# Patient Record
Sex: MALE | Race: WHITE | NOT HISPANIC OR LATINO | Employment: OTHER | URBAN - METROPOLITAN AREA
[De-identification: names, ages, dates, MRNs, and addresses within clinical notes are randomized per-mention and may not be internally consistent; named-entity substitution may affect disease eponyms.]

---

## 2017-05-18 PROBLEM — R50.9 FEBRILE ILLNESS: Status: ACTIVE | Noted: 2017-05-18

## 2017-11-30 ENCOUNTER — TELEPHONE (OUTPATIENT)
Dept: TRANSPLANT | Facility: CLINIC | Age: 47
End: 2017-11-30

## 2017-11-30 NOTE — TELEPHONE ENCOUNTER
----- Message from Brittany Mcguire sent at 11/30/2017 12:18 PM CST -----  We have the pt recorders and they are now pending review by the referral nurse.  By:Brittany Mcguire

## 2017-12-01 ENCOUNTER — DOCUMENTATION ONLY (OUTPATIENT)
Dept: TRANSPLANT | Facility: CLINIC | Age: 47
End: 2017-12-01

## 2017-12-01 NOTE — LETTER
December 1, 2017    Erik Saravia  [id number]   [place] [address]      Dear Erik Saravia:    Your doctor has referred you to the [facility]. You will be contacted by our office and an initial appointment will then be scheduled for you.    We look forward to seeing you soon. If you have any further questions, please contact us at [phone].       Sincerely,        [facility]   [address] [place] [address] [phone]

## 2017-12-01 NOTE — LETTER
December 1, 2017    Kamaljit Sutton MD  [address] [facility] [address]  [facility] [address]      Dear Dr. Sutton    Patient: Erik Saravia   MR Number: [id number]   YOB: 1970     Thank you for the referral of Erik Saravia to the [facility]. An initial appointment will be scheduled for your patient with one of our Hepatologists.      Thank you again for your trust in our program.  If there is anything we can do for you or your staff, please feel free to contact us.        Sincerely,        [facility]  [address] [place] [address] [phone]

## 2017-12-01 NOTE — NURSING
Pt records reviewed.  Pt will be referred to Hepatology due to PSC  Initial referral received  from Kamaljit Sutton's  office.   Referral letter sent to provider and patient.  Pt records reviewed.

## 2018-01-23 ENCOUNTER — LAB VISIT (OUTPATIENT)
Dept: LAB | Facility: HOSPITAL | Age: 48
End: 2018-01-23
Attending: INTERNAL MEDICINE
Payer: MEDICAID

## 2018-01-23 ENCOUNTER — PROCEDURE VISIT (OUTPATIENT)
Dept: HEPATOLOGY | Facility: CLINIC | Age: 48
End: 2018-01-23
Attending: INTERNAL MEDICINE
Payer: MEDICAID

## 2018-01-23 ENCOUNTER — OFFICE VISIT (OUTPATIENT)
Dept: HEPATOLOGY | Facility: CLINIC | Age: 48
End: 2018-01-23
Payer: MEDICAID

## 2018-01-23 VITALS
BODY MASS INDEX: 26.67 KG/M2 | WEIGHT: 190.5 LBS | OXYGEN SATURATION: 98 % | HEIGHT: 71 IN | SYSTOLIC BLOOD PRESSURE: 137 MMHG | RESPIRATION RATE: 18 BRPM | DIASTOLIC BLOOD PRESSURE: 84 MMHG | HEART RATE: 67 BPM

## 2018-01-23 DIAGNOSIS — K83.01 PRIMARY SCLEROSING CHOLANGITIS: ICD-10-CM

## 2018-01-23 DIAGNOSIS — K83.01 PSC (PRIMARY SCLEROSING CHOLANGITIS): Primary | ICD-10-CM

## 2018-01-23 DIAGNOSIS — R74.8 ELEVATED LIVER ENZYMES: ICD-10-CM

## 2018-01-23 DIAGNOSIS — K83.01 PSC (PRIMARY SCLEROSING CHOLANGITIS): ICD-10-CM

## 2018-01-23 LAB
ALBUMIN SERPL BCP-MCNC: 3.5 G/DL
ALP SERPL-CCNC: 248 U/L
ALT SERPL W/O P-5'-P-CCNC: 102 U/L
ANION GAP SERPL CALC-SCNC: 6 MMOL/L
AST SERPL-CCNC: 56 U/L
BASOPHILS # BLD AUTO: 0.09 K/UL
BASOPHILS NFR BLD: 1.3 %
BILIRUB SERPL-MCNC: 0.5 MG/DL
BUN SERPL-MCNC: 13 MG/DL
CALCIUM SERPL-MCNC: 10.3 MG/DL
CANCER AG19-9 SERPL-ACNC: 29 U/ML
CHLORIDE SERPL-SCNC: 107 MMOL/L
CO2 SERPL-SCNC: 27 MMOL/L
CREAT SERPL-MCNC: 0.9 MG/DL
DIFFERENTIAL METHOD: NORMAL
EOSINOPHIL # BLD AUTO: 0.2 K/UL
EOSINOPHIL NFR BLD: 3 %
ERYTHROCYTE [DISTWIDTH] IN BLOOD BY AUTOMATED COUNT: 13 %
EST. GFR  (AFRICAN AMERICAN): >60 ML/MIN/1.73 M^2
EST. GFR  (NON AFRICAN AMERICAN): >60 ML/MIN/1.73 M^2
FERRITIN SERPL-MCNC: 92 NG/ML
GLUCOSE SERPL-MCNC: 92 MG/DL
HBV SURFACE AB SER-ACNC: POSITIVE M[IU]/ML
HCT VFR BLD AUTO: 43.9 %
HEPATITIS A ANTIBODY, IGG: POSITIVE
HGB BLD-MCNC: 14.9 G/DL
IGG SERPL-MCNC: 1163 MG/DL
IMM GRANULOCYTES # BLD AUTO: 0.02 K/UL
IMM GRANULOCYTES NFR BLD AUTO: 0.3 %
INR PPP: 0.9
IRON SERPL-MCNC: 64 UG/DL
LYMPHOCYTES # BLD AUTO: 2.6 K/UL
LYMPHOCYTES NFR BLD: 36.6 %
MCH RBC QN AUTO: 30.5 PG
MCHC RBC AUTO-ENTMCNC: 33.9 G/DL
MCV RBC AUTO: 90 FL
MONOCYTES # BLD AUTO: 0.6 K/UL
MONOCYTES NFR BLD: 9.2 %
NEUTROPHILS # BLD AUTO: 3.5 K/UL
NEUTROPHILS NFR BLD: 49.6 %
NRBC BLD-RTO: 0 /100 WBC
PLATELET # BLD AUTO: 267 K/UL
PMV BLD AUTO: 10.5 FL
POTASSIUM SERPL-SCNC: 4.3 MMOL/L
PROT SERPL-MCNC: 7.5 G/DL
PROTHROMBIN TIME: 9.8 SEC
RBC # BLD AUTO: 4.89 M/UL
SATURATED IRON: 17 %
SODIUM SERPL-SCNC: 140 MMOL/L
TOTAL IRON BINDING CAPACITY: 386 UG/DL
TRANSFERRIN SERPL-MCNC: 261 MG/DL
WBC # BLD AUTO: 6.97 K/UL

## 2018-01-23 PROCEDURE — 85025 COMPLETE CBC W/AUTO DIFF WBC: CPT

## 2018-01-23 PROCEDURE — 99204 OFFICE O/P NEW MOD 45 MIN: CPT | Mod: S$PBB,25,, | Performed by: INTERNAL MEDICINE

## 2018-01-23 PROCEDURE — 86706 HEP B SURFACE ANTIBODY: CPT

## 2018-01-23 PROCEDURE — 91200 LIVER ELASTOGRAPHY: CPT | Mod: PBBFAC | Performed by: INTERNAL MEDICINE

## 2018-01-23 PROCEDURE — 86790 VIRUS ANTIBODY NOS: CPT

## 2018-01-23 PROCEDURE — 36415 COLL VENOUS BLD VENIPUNCTURE: CPT

## 2018-01-23 PROCEDURE — 86235 NUCLEAR ANTIGEN ANTIBODY: CPT

## 2018-01-23 PROCEDURE — 86256 FLUORESCENT ANTIBODY TITER: CPT | Mod: 91

## 2018-01-23 PROCEDURE — 82784 ASSAY IGA/IGD/IGG/IGM EACH: CPT

## 2018-01-23 PROCEDURE — 85610 PROTHROMBIN TIME: CPT

## 2018-01-23 PROCEDURE — 99999 PR PBB SHADOW E&M-EST. PATIENT-LVL IV: CPT | Mod: PBBFAC,,, | Performed by: INTERNAL MEDICINE

## 2018-01-23 PROCEDURE — 91200 LIVER ELASTOGRAPHY: CPT | Mod: 26,S$PBB,, | Performed by: INTERNAL MEDICINE

## 2018-01-23 PROCEDURE — 99214 OFFICE O/P EST MOD 30 MIN: CPT | Mod: PBBFAC,25 | Performed by: INTERNAL MEDICINE

## 2018-01-23 PROCEDURE — 83540 ASSAY OF IRON: CPT

## 2018-01-23 PROCEDURE — 82728 ASSAY OF FERRITIN: CPT

## 2018-01-23 PROCEDURE — 86301 IMMUNOASSAY TUMOR CA 19-9: CPT

## 2018-01-23 PROCEDURE — 86038 ANTINUCLEAR ANTIBODIES: CPT

## 2018-01-23 PROCEDURE — 80053 COMPREHEN METABOLIC PANEL: CPT

## 2018-01-23 RX ORDER — PREDNISONE 5 MG/1
5 TABLET ORAL DAILY
Status: ON HOLD | COMMUNITY
Start: 2017-11-15 | End: 2021-11-25 | Stop reason: HOSPADM

## 2018-01-23 RX ORDER — DICYCLOMINE HYDROCHLORIDE 10 MG/1
10 CAPSULE ORAL DAILY PRN
COMMUNITY
Start: 2017-12-18

## 2018-01-23 NOTE — PROGRESS NOTES
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 He has had 2 ERCPs.    I had suggested a liver biopsy to r/o an autoimmune overlap, since his hepatic panel showed a hepatocellular pattern and not a cholestatic pattern. In addition, he has an ASMA that was positive, although weakly positive. This was not done.    He was treated with Simponi x 3. 5 yrs. Because his liver tests increased (, , ALKP 231, Tbil 1.5), this drug was stopped 10/17. Since then a repeat hepatic panel was done today: , AST 56, Tbil 0.5, ALKP 248.    Outpatient Encounter Prescriptions as of 1/23/2018   Medication Sig Dispense Refill    dicyclomine (BENTYL) 10 MG capsule Take 10 mg by mouth as needed.      predniSONE (DELTASONE) 5 MG tablet Take 5 mg by mouth once daily.      ALBUTEROL INHL Inhale 1 puff into the lungs every 6 (six) hours as needed.      BIFIDOBACTERIUM INFANTIS (ALIGN ORAL) Take 1 capsule by mouth every morning.       fluticasone (FLONASE) 50 mcg/actuation nasal spray 1 spray by Each Nare route 2 (two) times daily as needed for Rhinitis or Allergies. 16 g 11    GOLIMUMAB (SIMPONI SUBQ) Inject 50 mg into the skin every 30 days.      MONTELUKAST SODIUM (SINGULAIR ORAL) Take 5 mg by mouth every morning.      omeprazole (PRILOSEC OTC) 20 MG tablet Take 20 mg by mouth every morning.      ondansetron (ZOFRAN) 4 MG tablet Take 1 tablet (4 mg total) by mouth every 6 (six) hours as needed. 12 tablet 0    tamsulosin (FLOMAX) 0.4 mg Cp24 Take 1 capsule (0.4 mg total) by mouth once daily. (Patient taking differently: Take 0.4 mg by mouth every morning. ) 90 capsule 3    vancomycin (VANCOCIN) 250 MG capsule Take 250 mg by mouth every Mon, Wed, Fri.        No facility-administered encounter medications on file as of 1/23/2018.      Allergies   Allergen Reactions    Cinnamon Analogues  Hives and Itching    Ciprofloxacin      Other reaction(s): Rash  Other reaction(s): Itching  Other reaction(s): Hives    Mesalamine      Other reaction(s): Diarrhea  Other reaction(s): blood stool and diarrhea    Uceris [Budesonide]      Migraines/headaches intensify    Zolmitriptan      Other reaction(s): Angioedema     Past Medical History:   Diagnosis Date    Allergy     Anemia     Asthma     External hemorrhoids with other complication 5/8/2011    General anesthetics causing adverse effect in therapeutic use     History of recent hospitalization     [facility]: 9/23/2016 to 9/24/2016: Fever and UC flare    History of recent hospitalization     [facility]: 10/29/2016 to 10/31/2016 for fever and UC flare    Liver disease     Migraines     Pancreatitis     Primary sclerosing cholangitis     Ulcerative colitis        Review of Systems   Constitutional: Negative.    HENT: Negative.    Eyes: Negative.    Respiratory: Negative.    Cardiovascular: Negative.    Gastrointestinal: Negative.    Genitourinary: Negative.    Musculoskeletal: Negative.    Skin: Negative.    Neurological: Negative.    Psychiatric/Behavioral: Negative.      Vitals:    01/23/18 1020   BP: 137/84   Pulse: 67   Resp: 18       Physical Exam   Constitutional: He is oriented to person, place, and time. He appears well-developed and well-nourished.   HENT:   Head: Normocephalic and atraumatic.   Eyes: Conjunctivae and EOM are normal. Pupils are equal, round, and reactive to light. No scleral icterus.   Neck: Normal range of motion. Neck supple. No thyromegaly present.   Cardiovascular: Normal rate, regular rhythm and normal heart sounds.    Pulmonary/Chest: Effort normal and breath sounds normal. He has no rales.   Abdominal: Soft. Bowel sounds are normal. He exhibits no distension and no mass. There is no tenderness.   Musculoskeletal: Normal range of motion. He exhibits no edema.   Neurological: He is alert and oriented to person, place, and time.    Skin: Skin is warm and dry. No rash noted.   Psychiatric: He has a normal mood and affect.   Vitals reviewed.      Lab Results   Component Value Date     (H) 11/01/2017    BUN 13 11/01/2017    CREATININE 1.19 11/01/2017    CALCIUM 10.1 11/01/2017     11/01/2017    K 3.6 11/01/2017    CL 97 11/01/2017    PROT 7.8 11/01/2017    CO2 31 11/01/2017    ANIONGAP 11 11/01/2017    WBC 11.19 11/01/2017    RBC 4.91 11/01/2017    HGB 15.4 11/01/2017    HCT 44.8 11/01/2017    MCV 91 11/01/2017    MCH 31.4 (H) 11/01/2017    MCHC 34.4 11/01/2017     Lab Results   Component Value Date    RDW 14.1 11/01/2017     11/01/2017    MPV 10.7 11/01/2017    GRAN 9.5 (H) 11/01/2017    GRAN 84.5 (H) 11/01/2017    LYMPH 1.0 11/01/2017    LYMPH 8.8 (L) 11/01/2017    MONO 0.7 11/01/2017    MONO 6.3 11/01/2017    EOSINOPHIL 0.0 11/01/2017    BASOPHIL 0.4 11/01/2017    EOS 0.0 11/01/2017    BASO 0.04 11/01/2017    APTT 25.8 05/07/2008    MAGNOLIA Negative 04/04/2012    CHOL 207 (H) 04/30/2016    TRIG 110 04/30/2016    HDL 63 04/30/2016    CHOLHDL 30.4 04/30/2016    TOTALCHOLEST 3.3 04/30/2016    ALBUMIN 4.3 11/01/2017    BILIDIR 0.1 07/19/2013     (H) 11/01/2017    AST 74 (H) 02/24/2016     (H) 11/01/2017    ALKPHOS 231 (H) 11/01/2017    MG 1.8 05/19/2017    LABPROT 10.1 11/22/2013    INR 1.0 10/31/2016    INR 0.9 11/22/2013    QUANTIFERON Negative 08/21/2013         Assessment and Plan:    Erik Saravia is a 47 y.o. male wita history of PSC and Elevated Hepatic Enzymes  in the setting of UC. My current recommendations:  1. Elevated liver tests- This may have been due to the biologic. I would consider restarting with close f/u since the pt thinks this biologic worked very well for him. Autoimmune hepatitis remains on the differential A liver biopsy is the only way to rule this in/out. He would like to discuss this with his local GI.  2. PSC. Monitor for cholangitis. Vanco through Dr Templeton. Check CA 19-9; annual  screening for cholangioca; fibroscan      Return prn  .

## 2018-01-23 NOTE — PATIENT INSTRUCTIONS
1. Labs today- may need liver biopsy. If normal then restart folimumab and monitor liver enzymes closely  2. vanco through Dr Templeton  3.   4. Annual screening for cholangioca  5. fibroscan  Return to be determined

## 2018-01-24 LAB
ANA SER QL IF: NORMAL
MITOCHONDRIA AB TITR SER IF: NORMAL {TITER}
SMOOTH MUSCLE AB TITR SER IF: NORMAL {TITER}

## 2018-01-28 NOTE — PROCEDURES
Procedures Fibroscan Procedure     Name: Erik Saravia  Date of Procedure : 2018   :: Shila Becerra MD  Diagnosis: Cholestatic    Probe: M    Fibroscan readin.4 KPa    Fibrosis:F2     CAP readin dB/m    Steatosis: :S2

## 2018-01-29 ENCOUNTER — TELEPHONE (OUTPATIENT)
Dept: GASTROENTEROLOGY | Facility: CLINIC | Age: 48
End: 2018-01-29

## 2018-01-29 NOTE — TELEPHONE ENCOUNTER
Left  for Ivette, informing her that we have received this referral. [phone] call back number provided.

## 2018-01-29 NOTE — TELEPHONE ENCOUNTER
----- Message from Jolanta Arce sent at 1/29/2018 10:28 AM CST -----  Contact: Dr. Kamaljit Sutton  Good morning, Dr. Sutton would like to refer the following patient to Dr. Templeton in the gastroenterology department. The patients diagnosis is UC. I have scanned the patients records into media manager. If there are any further questions in regards to the patient, please contact Dr. Sutton's referral coordinator, Ivette, at [phone]. Also, my extension is [phone].   Please let me know if I can help schedule in any way.  Thank you,   Jolanta

## 2018-02-16 ENCOUNTER — TELEPHONE (OUTPATIENT)
Dept: GASTROENTEROLOGY | Facility: CLINIC | Age: 48
End: 2018-02-16

## 2018-02-16 NOTE — TELEPHONE ENCOUNTER
Pt is scheduled for a new patient clinic appointment with Dr. MANDY Templeton on 2/26/2018. E-mail and physical letter sent to pt with appointment reminder, new patient welcome letter as well as a campus map.    Dr. Sutton's office notified of appointment date.
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
## 2018-02-26 ENCOUNTER — OFFICE VISIT (OUTPATIENT)
Dept: GASTROENTEROLOGY | Facility: CLINIC | Age: 48
End: 2018-02-26
Payer: MEDICAID

## 2018-02-26 ENCOUNTER — LAB VISIT (OUTPATIENT)
Dept: LAB | Facility: HOSPITAL | Age: 48
End: 2018-02-26
Attending: INTERNAL MEDICINE
Payer: MEDICAID

## 2018-02-26 VITALS
WEIGHT: 191.13 LBS | SYSTOLIC BLOOD PRESSURE: 131 MMHG | BODY MASS INDEX: 26.76 KG/M2 | DIASTOLIC BLOOD PRESSURE: 91 MMHG | TEMPERATURE: 98 F | HEIGHT: 71 IN | HEART RATE: 77 BPM

## 2018-02-26 DIAGNOSIS — Z86.19 HISTORY OF CLOSTRIDIUM DIFFICILE INFECTION: ICD-10-CM

## 2018-02-26 DIAGNOSIS — R19.7 DIARRHEA, UNSPECIFIED TYPE: ICD-10-CM

## 2018-02-26 DIAGNOSIS — K51.00 ULCERATIVE PANCOLITIS WITHOUT COMPLICATION: Primary | ICD-10-CM

## 2018-02-26 DIAGNOSIS — K83.01 PRIMARY SCLEROSING CHOLANGITIS: ICD-10-CM

## 2018-02-26 DIAGNOSIS — K51.00 ULCERATIVE PANCOLITIS WITHOUT COMPLICATION: ICD-10-CM

## 2018-02-26 PROBLEM — R50.9 FEBRILE ILLNESS: Status: RESOLVED | Noted: 2017-05-18 | Resolved: 2018-02-26

## 2018-02-26 PROCEDURE — 86480 TB TEST CELL IMMUN MEASURE: CPT

## 2018-02-26 PROCEDURE — 99214 OFFICE O/P EST MOD 30 MIN: CPT | Mod: PBBFAC | Performed by: INTERNAL MEDICINE

## 2018-02-26 PROCEDURE — 3008F BODY MASS INDEX DOCD: CPT | Mod: ,,, | Performed by: INTERNAL MEDICINE

## 2018-02-26 PROCEDURE — 36415 COLL VENOUS BLD VENIPUNCTURE: CPT

## 2018-02-26 PROCEDURE — 99205 OFFICE O/P NEW HI 60 MIN: CPT | Mod: S$PBB,,, | Performed by: INTERNAL MEDICINE

## 2018-02-26 PROCEDURE — 99999 PR PBB SHADOW E&M-EST. PATIENT-LVL IV: CPT | Mod: PBBFAC,,, | Performed by: INTERNAL MEDICINE

## 2018-02-26 NOTE — PATIENT INSTRUCTIONS
Instructions:  - labs today  - stool studies today--can turn in at [facility] this week  - continue prednisone 5 mg PO daily per Dr. Sutton  - continue bentyl 10 mg PRN per Dr. Sutton  - do not restart Simponi  - Dexa scan--defer to Dr. Sutton once off of prednisone  - low fiber/low residue diet  - options for treatment--Dr. Templeton will send a detailed letter to Dr. Sutton:   1. High dose vancomycin--500 mg TID  2. Weight-based dose Imuran (2.5 mg/kg)  3. Entyvio  4. Remicade  - Avoid all NSAIDs (Advil, Ibuprofen, Motrin, Aspirin, Naprosyn, Aleve)  - Yearly Eye exam  - Yearly Skin exam--wear sun block and hats  - Use antibiotics with caution  - Vaccines needed:   Flu shot yearly  Tetanus every 10 years  Pneumonia 13  (PCV13) vaccine initial  Pneumonia 23 (PPSV23) due again at age 63  - Follow up with Dr. Sutton in next 2 weeks

## 2018-02-26 NOTE — LETTER
February 26, 2018        Kamaljit Sutton MD  [address] [facility] [address]  [facility] [address]             [facility]  [address]  [facility] [address]  Phone: [phone]  Fax: [phone]   Patient: Erik Saravia   MR Number: [id number]   YOB: 1970   Date of Visit: 2/26/2018       Dear Dr. Sutton:    Thank you for referring Erik Saravia to me for evaluation. Attached you will find relevant portions of my assessment and plan of care.    If you have questions, please do not hesitate to call me. I look forward to following Erik Saravia along with you.    Sincerely,      Johny Templeton MD            CC  Shila Becerra MD    [facility]

## 2018-02-27 ENCOUNTER — LAB VISIT (OUTPATIENT)
Dept: LAB | Facility: HOSPITAL | Age: 48
End: 2018-02-27
Attending: FAMILY MEDICINE
Payer: MEDICAID

## 2018-02-27 DIAGNOSIS — R19.7 DIARRHEA, UNSPECIFIED TYPE: ICD-10-CM

## 2018-02-27 DIAGNOSIS — Z86.19 HISTORY OF CLOSTRIDIUM DIFFICILE INFECTION: ICD-10-CM

## 2018-02-27 DIAGNOSIS — K51.00 ULCERATIVE PANCOLITIS WITHOUT COMPLICATION: ICD-10-CM

## 2018-02-27 LAB
C DIFF GDH STL QL: POSITIVE
C DIFF TOX A+B STL QL IA: POSITIVE
MITOGEN NIL: >10 IU/ML
NIL: 0.04 IU/ML
TB ANTIGEN NIL: -0.01 IU/ML
TB ANTIGEN: 0.03 IU/ML
TB GOLD: NEGATIVE

## 2018-02-27 PROCEDURE — 87046 STOOL CULTR AEROBIC BACT EA: CPT

## 2018-02-27 PROCEDURE — 87427 SHIGA-LIKE TOXIN AG IA: CPT | Mod: 59

## 2018-02-27 PROCEDURE — 87045 FECES CULTURE AEROBIC BACT: CPT

## 2018-02-27 PROCEDURE — 87209 SMEAR COMPLEX STAIN: CPT

## 2018-02-27 PROCEDURE — 87329 GIARDIA AG IA: CPT

## 2018-02-27 PROCEDURE — 87449 NOS EACH ORGANISM AG IA: CPT

## 2018-02-27 PROCEDURE — 83993 ASSAY FOR CALPROTECTIN FECAL: CPT

## 2018-02-28 ENCOUNTER — TELEPHONE (OUTPATIENT)
Dept: GASTROENTEROLOGY | Facility: CLINIC | Age: 48
End: 2018-02-28

## 2018-02-28 LAB
CRYPTOSP AG STL QL IA: NEGATIVE
G LAMBLIA AG STL QL IA: NEGATIVE
O+P STL TRI STN: NORMAL

## 2018-02-28 NOTE — TELEPHONE ENCOUNTER
Called patient and let him know that he has stool C diff positive. Ideally he would benefit from oral vancomycin given this is may also help as off label treatment for his UC and PSC. Discussed this with Dr. Sutton and he will be starting high dose oral vancomycin 500 mg TID (alvogen, ANI brand preferred).      Our office will fax all results of labs/stool studies thus far to Dr. Sutton's office along with this note.     MANDY Templeton

## 2018-02-28 NOTE — TELEPHONE ENCOUNTER
----- Message from LAN Vivas sent at 2/28/2018  8:24 AM CST -----  Elma,    Can you call the lab and enquire why there is no C. Diff PCR pending if the patient's C. Diff antigen and toxin is positive.  I do not see one pending.    Thanks,  Melany

## 2018-02-28 NOTE — TELEPHONE ENCOUNTER
Kristi spoke to the her senior the lab and was informed that if the C Diff antigen and toxin are positive, a reflex PCR is not necessary.

## 2018-03-01 LAB
E COLI SXT1 STL QL IA: NEGATIVE
E COLI SXT2 STL QL IA: NEGATIVE

## 2018-03-03 LAB — BACTERIA STL CULT: NORMAL

## 2018-03-06 LAB — CALPROTECTIN STL-MCNT: 421.8 MCG/G

## 2018-04-10 ENCOUNTER — TELEPHONE (OUTPATIENT)
Dept: GASTROENTEROLOGY | Facility: CLINIC | Age: 48
End: 2018-04-10

## 2018-04-10 NOTE — TELEPHONE ENCOUNTER
"----- Message from Jolanta Arce sent at 4/10/2018  1:53 PM CDT -----  Contact: Additional records  Good afternoon, I received the following patients additional medical records and scanned them into the  tab. I labelled the documents under, "gastro records part 4" Please let me know if there is anything further I can do.   Thank you,   Jolanta"

## 2018-07-31 ENCOUNTER — TELEPHONE (OUTPATIENT)
Dept: GASTROENTEROLOGY | Facility: CLINIC | Age: 48
End: 2018-07-31

## 2018-07-31 NOTE — TELEPHONE ENCOUNTER
----- Message from Marshall Martinez sent at 7/31/2018  1:36 PM CDT -----  Clinic notes were sent over for pt and are scanned into media mgr within [facility].

## 2019-03-08 PROBLEM — R13.10 DYSPHAGIA: Status: ACTIVE | Noted: 2019-03-08

## 2021-11-23 PROBLEM — R50.9 FEVER: Status: ACTIVE | Noted: 2021-11-23

## 2021-11-23 PROBLEM — E86.1 INTRAVASCULAR VOLUME DEPLETION: Status: ACTIVE | Noted: 2021-11-23

## 2022-07-01 ENCOUNTER — TELEPHONE (OUTPATIENT)
Dept: PODIATRY | Facility: CLINIC | Age: 52
End: 2022-07-01
Payer: MEDICAID

## 2022-07-01 NOTE — TELEPHONE ENCOUNTER
----- Message from Monique Ellsworth sent at 7/1/2022 12:50 PM CDT -----  Contact: [phone]  Type: Needs Medical Advice  Who Called:  Pt    Best Call Back Number: [phone]    Additional Information: Pt is calling to schedule off a referral that was faxed over to your office. Pls call back and advise     Referral from [facility]

## 2022-08-22 ENCOUNTER — OFFICE VISIT (OUTPATIENT)
Dept: PODIATRY | Facility: CLINIC | Age: 52
End: 2022-08-22
Payer: MEDICAID

## 2022-08-22 ENCOUNTER — HOSPITAL ENCOUNTER (OUTPATIENT)
Dept: RADIOLOGY | Facility: HOSPITAL | Age: 52
Discharge: HOME | End: 2022-08-22
Attending: PODIATRIST
Payer: MEDICAID

## 2022-08-22 DIAGNOSIS — M21.6X2 ACQUIRED EQUINUS DEFORMITY OF BOTH FEET: ICD-10-CM

## 2022-08-22 DIAGNOSIS — B35.1 ONYCHOMYCOSIS DUE TO DERMATOPHYTE: ICD-10-CM

## 2022-08-22 DIAGNOSIS — M21.6X1 ACQUIRED EQUINUS DEFORMITY OF BOTH FEET: ICD-10-CM

## 2022-08-22 DIAGNOSIS — M76.821 POSTERIOR TIBIAL TENDON DYSFUNCTION (PTTD) OF RIGHT LOWER EXTREMITY: ICD-10-CM

## 2022-08-22 DIAGNOSIS — M76.821 POSTERIOR TIBIAL TENDON DYSFUNCTION (PTTD) OF RIGHT LOWER EXTREMITY: Primary | ICD-10-CM

## 2022-08-22 PROCEDURE — 99204 PR OFFICE/OUTPT VISIT, NEW, LEVL IV, 45-59 MIN: ICD-10-PCS | Mod: S$PBB,,, | Performed by: PODIATRIST

## 2022-08-22 PROCEDURE — 99213 OFFICE O/P EST LOW 20 MIN: CPT | Mod: PBBFAC,PN | Performed by: PODIATRIST

## 2022-08-22 PROCEDURE — 99999 PR PBB SHADOW E&M-EST. PATIENT-LVL III: ICD-10-PCS | Mod: PBBFAC,,, | Performed by: PODIATRIST

## 2022-08-22 PROCEDURE — 73630 XR FOOT COMPLETE 3 VIEW RIGHT: ICD-10-PCS | Mod: 26,RT,, | Performed by: RADIOLOGY

## 2022-08-22 PROCEDURE — 1160F PR REVIEW ALL MEDS BY PRESCRIBER/CLIN PHARMACIST DOCUMENTED: ICD-10-PCS | Mod: CPTII,,, | Performed by: PODIATRIST

## 2022-08-22 PROCEDURE — 1159F PR MEDICATION LIST DOCUMENTED IN MEDICAL RECORD: ICD-10-PCS | Mod: CPTII,,, | Performed by: PODIATRIST

## 2022-08-22 PROCEDURE — 73630 X-RAY EXAM OF FOOT: CPT | Mod: TC,PO,RT

## 2022-08-22 PROCEDURE — 99999 PR PBB SHADOW E&M-EST. PATIENT-LVL III: CPT | Mod: PBBFAC,,, | Performed by: PODIATRIST

## 2022-08-22 PROCEDURE — 99204 OFFICE O/P NEW MOD 45 MIN: CPT | Mod: S$PBB,,, | Performed by: PODIATRIST

## 2022-08-22 PROCEDURE — 1160F RVW MEDS BY RX/DR IN RCRD: CPT | Mod: CPTII,,, | Performed by: PODIATRIST

## 2022-08-22 PROCEDURE — 1159F MED LIST DOCD IN RCRD: CPT | Mod: CPTII,,, | Performed by: PODIATRIST

## 2022-08-22 PROCEDURE — 73630 X-RAY EXAM OF FOOT: CPT | Mod: 26,RT,, | Performed by: RADIOLOGY

## 2022-08-22 NOTE — PROGRESS NOTES
Subjective:      Patient ID: Erik Saravia is a 52 y.o. male.    Chief Complaint: Ankle Injury    Erik is a 52 y.o. male who presents to the podiatry clinic  with complaint of  right foot pain. Onset of the symptoms was several weeks ago. Precipitating event: first had an inversion injury months ago and as that was getting better a large wood beam fell on his foot. Current symptoms include: ability to bear weight, but with some pain, swelling and worsening symptoms after a period of activity. Aggravating factors: any weight bearing. Symptoms have waxed and waned. Patient has had prior foot problems. Evaluation to date: plain films: only some spurring noted. Treatment to date: none. Patients rates pain 6/10 on pain scale.    Also has some questions about some discoloration in the left second nail.      Review of Systems   Constitutional: Negative for chills and fever.   Cardiovascular: Negative for claudication and leg swelling.   Respiratory: Negative for shortness of breath.    Skin: Positive for nail changes. Negative for itching and rash.   Musculoskeletal: Negative for muscle cramps, muscle weakness and myalgias.   Gastrointestinal: Negative for nausea and vomiting.   Neurological: Negative for focal weakness, loss of balance, numbness and paresthesias.           Objective:      Physical Exam  Constitutional:       General: He is not in acute distress.     Appearance: He is well-developed. He is not diaphoretic.   Cardiovascular:      Pulses:           Dorsalis pedis pulses are 2+ on the right side and 2+ on the left side.        Posterior tibial pulses are 2+ on the right side and 2+ on the left side.      Comments: < 3 sec capillary refill time to toes 1-5 bilateral. Toes and feet are warm to touch proximally with normal distal cooling b/l. There is some hair growth on the feet and toes b/l. There is no edema b/l. No spider veins or varicosities present b/l.     Musculoskeletal:      Comments: Equinus  noted b/l ankles with < 10 deg DF noted. MMT 5/5 in DF/PF/Inv/Ev resistance with no reproduction of pain in any direction. Passive range of motion of ankle and pedal joints is painless b/l.    Medial arch collapse with weight bearing bilateral worse on the right, there is pain to the right PT tendon distally and there is pain with single heel rise right foot.      Skin:     General: Skin is warm and dry.      Coloration: Skin is not pale.      Findings: No abrasion, bruising, burn, ecchymosis, erythema, laceration, lesion, petechiae or rash.      Nails: There is no clubbing.      Comments: Skin temperature, texture and turgor within normal limits.   Neurological:      Mental Status: He is alert and oriented to person, place, and time.      Sensory: No sensory deficit.      Motor: No tremor, atrophy or abnormal muscle tone.      Comments: Negative tinel sign bilateral.   Psychiatric:         Behavior: Behavior normal.               Assessment:       Encounter Diagnoses   Name Primary?    Posterior tibial tendon dysfunction (PTTD) of right lower extremity Yes    Acquired equinus deformity of both feet     Onychomycosis due to dermatophyte          Plan:       Erik was seen today for ankle injury.    Diagnoses and all orders for this visit:    Posterior tibial tendon dysfunction (PTTD) of right lower extremity  -     X-Ray Foot Complete Right; Future    Acquired equinus deformity of both feet  -     X-Ray Foot Complete Right; Future    Onychomycosis due to dermatophyte      I counseled the patient on his conditions, their implications and medical management.    Patient will obtain over the counter arch supports and wear them in shoes whenever possible.  Athletic shoes intended for walking or running are usually best.    Patient will stretch the tendo achilles complex three times daily as demonstrated in the office.  Literature was dispensed illustrating proper stretching technique.    Discussed treatment options  for fungal toenails to include topical vs oral vs laser vs combined therapy in detail.    Prescribed CMPD Voriconazole 2.67%, Vancomycin 6.67% Nail Suspension in DMSO, apply twice daily to affected nails    If pain persists consider boot and or MRI    Return 6 weeks for follow up    Jaswant Garner DPM

## 2022-10-03 ENCOUNTER — OFFICE VISIT (OUTPATIENT)
Dept: PODIATRY | Facility: CLINIC | Age: 52
End: 2022-10-03
Payer: MEDICAID

## 2022-10-03 VITALS — WEIGHT: 185.44 LBS | BODY MASS INDEX: 25.96 KG/M2 | HEIGHT: 71 IN

## 2022-10-03 DIAGNOSIS — M76.821 POSTERIOR TIBIAL TENDON DYSFUNCTION (PTTD) OF RIGHT LOWER EXTREMITY: Primary | ICD-10-CM

## 2022-10-03 DIAGNOSIS — M21.6X1 ACQUIRED EQUINUS DEFORMITY OF BOTH FEET: ICD-10-CM

## 2022-10-03 DIAGNOSIS — M21.6X2 ACQUIRED EQUINUS DEFORMITY OF BOTH FEET: ICD-10-CM

## 2022-10-03 PROCEDURE — 99213 OFFICE O/P EST LOW 20 MIN: CPT | Mod: PBBFAC,PN | Performed by: PODIATRIST

## 2022-10-03 PROCEDURE — 1159F MED LIST DOCD IN RCRD: CPT | Mod: CPTII,,, | Performed by: PODIATRIST

## 2022-10-03 PROCEDURE — 99999 PR PBB SHADOW E&M-EST. PATIENT-LVL III: CPT | Mod: PBBFAC,,, | Performed by: PODIATRIST

## 2022-10-03 PROCEDURE — 1160F RVW MEDS BY RX/DR IN RCRD: CPT | Mod: CPTII,,, | Performed by: PODIATRIST

## 2022-10-03 PROCEDURE — 1159F PR MEDICATION LIST DOCUMENTED IN MEDICAL RECORD: ICD-10-PCS | Mod: CPTII,,, | Performed by: PODIATRIST

## 2022-10-03 PROCEDURE — 99212 OFFICE O/P EST SF 10 MIN: CPT | Mod: S$PBB,,, | Performed by: PODIATRIST

## 2022-10-03 PROCEDURE — 99999 PR PBB SHADOW E&M-EST. PATIENT-LVL III: ICD-10-PCS | Mod: PBBFAC,,, | Performed by: PODIATRIST

## 2022-10-03 PROCEDURE — 1160F PR REVIEW ALL MEDS BY PRESCRIBER/CLIN PHARMACIST DOCUMENTED: ICD-10-PCS | Mod: CPTII,,, | Performed by: PODIATRIST

## 2022-10-03 PROCEDURE — 99212 PR OFFICE/OUTPT VISIT, EST, LEVL II, 10-19 MIN: ICD-10-PCS | Mod: S$PBB,,, | Performed by: PODIATRIST

## 2022-10-03 PROCEDURE — 3008F PR BODY MASS INDEX (BMI) DOCUMENTED: ICD-10-PCS | Mod: CPTII,,, | Performed by: PODIATRIST

## 2022-10-03 PROCEDURE — 3008F BODY MASS INDEX DOCD: CPT | Mod: CPTII,,, | Performed by: PODIATRIST

## 2022-10-03 NOTE — PROGRESS NOTES
Subjective:      Patient ID: Erik Saravia is a 52 y.o. male.    Chief Complaint: No chief complaint on file.    Erik is a 52 y.o. male who presents to the podiatry clinic  with complaint of  right foot pain. Onset of the symptoms was several weeks ago. Precipitating event:  first had an inversion injury months ago and as that was getting better a large wood beam fell on his foot . Current symptoms include: ability to bear weight, but with some pain, swelling and worsening symptoms after a period of activity. Aggravating factors: any weight bearing. Symptoms have waxed and waned. Patient has had prior foot problems. Evaluation to date: plain films: only some spurring noted . Treatment to date: none. Patients rates pain 6/10 on pain scale.    10/3/22: Patient presents for follow up right foot pain with PTTD and accessory navicular bone with pain, got the powerstep inserts and relates that the pain has improved greatly but relates that there are still some bad days occasionally.     Review of Systems   Constitutional: Negative for chills and fever.   Cardiovascular:  Negative for claudication and leg swelling.   Respiratory:  Negative for shortness of breath.    Skin:  Positive for nail changes. Negative for itching and rash.   Musculoskeletal:  Negative for muscle cramps, muscle weakness and myalgias.   Gastrointestinal:  Negative for nausea and vomiting.   Neurological:  Negative for focal weakness, loss of balance, numbness and paresthesias.         Objective:      Physical Exam  Constitutional:       General: He is not in acute distress.     Appearance: He is well-developed. He is not diaphoretic.   Cardiovascular:      Pulses:           Dorsalis pedis pulses are 2+ on the right side and 2+ on the left side.        Posterior tibial pulses are 2+ on the right side and 2+ on the left side.      Comments: < 3 sec capillary refill time to toes 1-5 bilateral. Toes and feet are warm to touch proximally with normal  distal cooling b/l. There is some hair growth on the feet and toes b/l. There is no edema b/l. No spider veins or varicosities present b/l.     Musculoskeletal:      Comments: Equinus noted b/l ankles with < 10 deg DF noted. MMT 5/5 in DF/PF/Inv/Ev resistance with no reproduction of pain in any direction. Passive range of motion of ankle and pedal joints is painless b/l.    Medial arch collapse with weight bearing bilateral worse on the right, there is pain to the right PT tendon distally and there is pain with single heel rise right foot.      Skin:     General: Skin is warm and dry.      Coloration: Skin is not pale.      Findings: No abrasion, bruising, burn, ecchymosis, erythema, laceration, lesion, petechiae or rash.      Nails: There is no clubbing.      Comments: Skin temperature, texture and turgor within normal limits.   Neurological:      Mental Status: He is alert and oriented to person, place, and time.      Sensory: No sensory deficit.      Motor: No tremor, atrophy or abnormal muscle tone.      Comments: Negative tinel sign bilateral.   Psychiatric:         Behavior: Behavior normal.             Assessment:       Encounter Diagnoses   Name Primary?    Posterior tibial tendon dysfunction (PTTD) of right lower extremity Yes    Acquired equinus deformity of both feet            Plan:       Diagnoses and all orders for this visit:    Posterior tibial tendon dysfunction (PTTD) of right lower extremity    Acquired equinus deformity of both feet      I counseled the patient on his conditions, their implications and medical management.    Patient will continue to wear over the counter arch supports and wear them in shoes whenever possible.  Athletic shoes intended for walking or running are usually best.    Patient will stretch the tendo achilles complex three times daily as demonstrated in the office.  Literature was dispensed illustrating proper stretching technique.      If pain persists consider boot and  or MRI possible custom orthotics or injection declined these for now    Return PRN    Jaswant Garner, IMERM

## 2024-05-07 ENCOUNTER — TELEPHONE (OUTPATIENT)
Dept: TRANSPLANT | Facility: CLINIC | Age: 54
End: 2024-05-07
Payer: MEDICAID

## 2024-05-07 NOTE — LETTER
May 7, 2024    Erik Saravia  [address] [facility] [address]      Dear Erik Saravia:    Your doctor has referred you to the [facility]. We are sending this letter to remind you to make an appointment with us to complete the referral process.     Please call us at [phone] to schedule an appointment. We look forward to seeing you soon.     If you received a call and have been scheduled, please disregard this letter.       Sincerely,        [facility]   [address] [place] [address] [phone]

## 2024-05-07 NOTE — TELEPHONE ENCOUNTER
----- Message from Brittany Mcguire sent at 5/7/2024 11:18 AM CDT -----    Called ref Md office, but there was no answer. LVM for them to fax pt info to [phone].  .  ----- Message -----  From: Brittany Mcguire  Sent: 5/6/2024   6:04 PM CDT  To: Che Hart; Brittany Mcguire      Hepatology referral received and scanned into media; pt chart sent to referral nurse for medical review.     Needs pt facesheet and MD note.    Referring Provider: Kaamljit Sutton MD  Phone: [phone]  Fax: [phone]  .

## 2024-05-07 NOTE — LETTER
May 7, 2024    Kamaljit Sutton MD  [address] [address]  [facility] [address]      Dear Dr. Sutton    Patient: Erik Saravia   MR Number: [id number]   YOB: 1970     Thank you for the referral of Erik Saravia to the [facility] program. An initial appointment will be scheduled for your patient with one of our Hepatologists.      Thank you again for your trust in our program.  If there is anything we can do for you or your staff, please feel free to contact us.        Sincerely,        [facility]  [address] [place] [address] [phone]

## 2024-05-07 NOTE — TELEPHONE ENCOUNTER
Pt records reviewed.  Pt will be referred to Hepatology due to ulcerative colitis and PSC LFTS WNL  Initial referral received  from Referring Provider: Kamaljit Sutton MD  Phone: [phone]  Fax: [phone]  Referral letter sent to patient.      RECORDS SCANNED IN MEDIA UNDER HEPATOLOGY REFERRAL .

## 2024-05-08 NOTE — TELEPHONE ENCOUNTER
The patient was contacted to schedule an appointment from a referral.  An appointment has been scheduled with Dr. Jonathan Oneal on Wednesday, June 12, 2024 at 3PM.  A copy of the appointment has been mailed out.

## 2024-06-12 ENCOUNTER — PROCEDURE VISIT (OUTPATIENT)
Dept: HEPATOLOGY | Facility: CLINIC | Age: 54
End: 2024-06-12
Payer: MEDICAID

## 2024-06-12 ENCOUNTER — LAB VISIT (OUTPATIENT)
Dept: LAB | Facility: HOSPITAL | Age: 54
End: 2024-06-12
Attending: INTERNAL MEDICINE
Payer: MEDICAID

## 2024-06-12 ENCOUNTER — OFFICE VISIT (OUTPATIENT)
Dept: HEPATOLOGY | Facility: CLINIC | Age: 54
End: 2024-06-12
Payer: MEDICAID

## 2024-06-12 VITALS
OXYGEN SATURATION: 99 % | HEIGHT: 71 IN | WEIGHT: 202.19 LBS | TEMPERATURE: 98 F | BODY MASS INDEX: 28.31 KG/M2 | SYSTOLIC BLOOD PRESSURE: 146 MMHG | HEART RATE: 84 BPM | DIASTOLIC BLOOD PRESSURE: 91 MMHG

## 2024-06-12 DIAGNOSIS — K51.00 ULCERATIVE PANCOLITIS WITHOUT COMPLICATION: ICD-10-CM

## 2024-06-12 DIAGNOSIS — K83.01 PSC (PRIMARY SCLEROSING CHOLANGITIS): ICD-10-CM

## 2024-06-12 DIAGNOSIS — K83.01 PSC (PRIMARY SCLEROSING CHOLANGITIS): Primary | ICD-10-CM

## 2024-06-12 DIAGNOSIS — K83.01 PRIMARY SCLEROSING CHOLANGITIS: ICD-10-CM

## 2024-06-12 LAB
AFP SERPL-MCNC: 3.5 NG/ML (ref 0–8.4)
ALBUMIN SERPL BCP-MCNC: 3.6 G/DL (ref 3.5–5.2)
ALP SERPL-CCNC: 104 U/L (ref 55–135)
ALT SERPL W/O P-5'-P-CCNC: 53 U/L (ref 10–44)
ANION GAP SERPL CALC-SCNC: 8 MMOL/L (ref 8–16)
AST SERPL-CCNC: 37 U/L (ref 10–40)
BILIRUB SERPL-MCNC: 0.4 MG/DL (ref 0.1–1)
BUN SERPL-MCNC: 14 MG/DL (ref 6–20)
CALCIUM SERPL-MCNC: 9.3 MG/DL (ref 8.7–10.5)
CANCER AG19-9 SERPL-ACNC: 30.6 U/ML (ref 0–40)
CHLORIDE SERPL-SCNC: 105 MMOL/L (ref 95–110)
CO2 SERPL-SCNC: 27 MMOL/L (ref 23–29)
CREAT SERPL-MCNC: 1.1 MG/DL (ref 0.5–1.4)
EST. GFR  (NO RACE VARIABLE): >60 ML/MIN/1.73 M^2
GLUCOSE SERPL-MCNC: 88 MG/DL (ref 70–110)
POTASSIUM SERPL-SCNC: 3.6 MMOL/L (ref 3.5–5.1)
PROT SERPL-MCNC: 6.7 G/DL (ref 6–8.4)
SODIUM SERPL-SCNC: 140 MMOL/L (ref 136–145)

## 2024-06-12 PROCEDURE — 1159F MED LIST DOCD IN RCRD: CPT | Mod: CPTII,,, | Performed by: INTERNAL MEDICINE

## 2024-06-12 PROCEDURE — 91200 LIVER ELASTOGRAPHY: CPT | Mod: PBBFAC | Performed by: INTERNAL MEDICINE

## 2024-06-12 PROCEDURE — 99999 PR PBB SHADOW E&M-EST. PATIENT-LVL IV: CPT | Mod: PBBFAC,,, | Performed by: INTERNAL MEDICINE

## 2024-06-12 PROCEDURE — 99214 OFFICE O/P EST MOD 30 MIN: CPT | Mod: PBBFAC,25 | Performed by: INTERNAL MEDICINE

## 2024-06-12 PROCEDURE — 82105 ALPHA-FETOPROTEIN SERUM: CPT | Performed by: INTERNAL MEDICINE

## 2024-06-12 PROCEDURE — 80053 COMPREHEN METABOLIC PANEL: CPT | Performed by: INTERNAL MEDICINE

## 2024-06-12 PROCEDURE — 36415 COLL VENOUS BLD VENIPUNCTURE: CPT | Performed by: INTERNAL MEDICINE

## 2024-06-12 PROCEDURE — 3008F BODY MASS INDEX DOCD: CPT | Mod: CPTII,,, | Performed by: INTERNAL MEDICINE

## 2024-06-12 PROCEDURE — 3077F SYST BP >= 140 MM HG: CPT | Mod: CPTII,,, | Performed by: INTERNAL MEDICINE

## 2024-06-12 PROCEDURE — 1160F RVW MEDS BY RX/DR IN RCRD: CPT | Mod: CPTII,,, | Performed by: INTERNAL MEDICINE

## 2024-06-12 PROCEDURE — 3080F DIAST BP >= 90 MM HG: CPT | Mod: CPTII,,, | Performed by: INTERNAL MEDICINE

## 2024-06-12 PROCEDURE — 99205 OFFICE O/P NEW HI 60 MIN: CPT | Mod: S$PBB,,, | Performed by: INTERNAL MEDICINE

## 2024-06-12 PROCEDURE — 86301 IMMUNOASSAY TUMOR CA 19-9: CPT | Performed by: INTERNAL MEDICINE

## 2024-06-12 RX ORDER — COPPER GLUCONATE 2 MG
TABLET ORAL
COMMUNITY

## 2024-06-12 NOTE — PROGRESS NOTES
Subjective:       Patient ID: Erik Saravia is a 54 y.o. male.    Chief Complaint: No chief complaint on file.    HPI  I saw this 54 y.o. man with PSC who previously saw Dr Becerra in 2018 and 2013.    ? 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    Previous MRI/MRCP showed normal biliary tree (last one in Nov 2021).  Last had an ERCP in 2008.    MRI abdo: 12/30/22  Geographic, patchy distribution of a hepatic steatosis.     Abdo US: 11/9/23  1. Heterogeneous areas of increased hepatic echogenicity corresponding to geographic hepatic steatosis on the prior MRI.  2. No intra or extrahepatic biliary ductal dilatation.    Frequent episodes of fever/chills that require antibiotics and are associated with LFT elevations.    Under regular follow up by Dr Sutton for his UC- on vamcomycin alone.      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   HENT:  Negative for hearing loss.    Eyes:  Negative for discharge and visual disturbance.   Respiratory:  Negative for cough, chest tightness, shortness of breath and wheezing.    Cardiovascular:  Negative for chest pain, palpitations and leg swelling.   Gastrointestinal:  Negative for abdominal distention, abdominal pain, constipation, diarrhea and nausea.   Genitourinary:  Negative for dysuria and frequency.   Musculoskeletal:  Negative for arthralgias and back pain.   Skin:  Negative for pallor and rash.   Neurological:  Negative for dizziness, tremors, speech difficulty and headaches.   Hematological:  Negative for adenopathy.   Psychiatric/Behavioral:  Negative for agitation and confusion.            Lab Results   Component Value Date    ALT 46 02/14/2024    AST 46 02/14/2024     (H)  02/24/2016    ALKPHOS 84 02/14/2024    BILITOT 0.5 02/14/2024     Past Medical History:   Diagnosis Date    Allergy     Anemia     Asthma     External hemorrhoids with other complication 5/8/2011    General anesthetics causing adverse effect in therapeutic use     History of recent hospitalization     [facility]: 9/23/2016 to 9/24/2016: Fever and UC flare    History of recent hospitalization     [facility]: 10/29/2016 to 10/31/2016 for fever and UC flare    Liver disease     Migraines     Pancreatitis     Primary sclerosing cholangitis     Ulcerative colitis      Past Surgical History:   Procedure Laterality Date    CHOLECYSTECTOMY      COLONOSCOPY Left 12/30/2015    Procedure: COLONOSCOPY;  Surgeon: Kamaljit Sutton MD;  Location: [facility];  Service: Endoscopy;  Laterality: Left;    COLONOSCOPY N/A 5/19/2017    Procedure: COLONOSCOPY;  Surgeon: Kamaljit Sutton MD;  Location: [facility] ENDO;  Service: Endoscopy;  Laterality: N/A;    COLONOSCOPY N/A 3/8/2019    Procedure: COLONOSCOPY;  Surgeon: Kamaljit Sutton MD;  Location: [facility];  Service: Endoscopy;  Laterality: N/A;    ERCP      temporary stent    ESOPHAGEAL DILATION N/A 3/8/2019    Procedure: DILATION, ESOPHAGUS;  Surgeon: Kamaljit Sutton MD;  Location: [facility];  Service: Endoscopy;  Laterality: N/A;    ESOPHAGOGASTRODUODENOSCOPY N/A 3/8/2019    Procedure: EGD (ESOPHAGOGASTRODUODENOSCOPY);  Surgeon: Kamaljit Sutton MD;  Location: [facility];  Service: Endoscopy;  Laterality: N/A;    Excision of thrombosed hemorrhoid  09/2016    SKIN BIOPSY      tonsillectomy      TONSILLECTOMY       Current Outpatient Medications   Medication Sig    ascorbic acid (VITAMIN C ORAL) Take 1 tablet by mouth once daily.    BIFIDOBACTERIUM INFANTIS (ALIGN ORAL) Take 1 capsule by mouth twice a week.    cholecalciferol, vitamin D3, (VITAMIN D3 ORAL) Take 1 capsule by mouth once daily.    dicyclomine (BENTYL) 10 MG capsule Take 10 mg by mouth daily as needed (abdominal cramps).     glycerin/witch hazel leaf (HEMORRHOID TOP) Apply topically daily as needed (hemorrhoids). Cream - otc    ondansetron (ZOFRAN-ODT) 4 MG TbDL Take 2 tablets (8 mg total) by mouth every 6 (six) hours as needed (naiusea).    pantoprazole (PROTONIX) 40 MG tablet Take 40 mg by mouth every morning.    ubrogepant (UBROGEPANT) 50 mg tablet Take 50 mg by mouth once as needed for Migraine. May repeat in 1 hour if needed but not more than 2 doses in a 24 hour period    vancomycin (VANCOCIN) 250 MG capsule Take 1,000 mg by mouth once daily.    ZINC ORAL Take 1 tablet by mouth once daily.    copper gluconate 2 mg Tab Take by mouth.     No current facility-administered medications for this visit.       Objective:      Physical Exam  HENT:      Head: Normocephalic.   Eyes:      Pupils: Pupils are equal, round, and reactive to light.   Neck:      Thyroid: No thyromegaly.   Cardiovascular:      Rate and Rhythm: Normal rate and regular rhythm.      Heart sounds: Normal heart sounds.   Pulmonary:      Effort: Pulmonary effort is normal.      Breath sounds: Normal breath sounds. No wheezing.   Abdominal:      General: There is no distension.      Palpations: Abdomen is soft. There is no mass.      Tenderness: There is no abdominal tenderness.   Lymphadenopathy:      Cervical: No cervical adenopathy.   Skin:     General: Skin is warm.      Findings: No erythema or rash.   Neurological:      Mental Status: He is alert and oriented to person, place, and time.   Psychiatric:         Behavior: Behavior normal.         Assessment:       1. PSC (primary sclerosing cholangitis)    2. Primary sclerosing cholangitis    3. Ulcerative pancolitis without complication        Plan:   This man with a previous diagnosis of PSC seems to have episodes that may be related to cholangitis about 1-2 x per year and requires antibiotics.  Previously intolerant of UDCA.    LFTs were normal in Feb 2024  Immune to HAV/HBV    Some flares in 2022 April 24- flare -  hospital x2    Some tremors x2 months- saw neurology. Low level copper    1) Fibroscan today= S0/F1  2) Ca 19 9 and CMP  3) MRCP    Clinic in 6 months

## 2024-06-12 NOTE — PROCEDURES
FibroScan Transplant Hepatology    Date/Time: 6/12/2024 3:30 PM    Performed by: Jonathan Oneal MD  Authorized by: Jonathan Oneal MD    Diagnosis:  Cholestatic    Probe:  XL    Universal Protocol: Patient's identity, procedure and site were verified, confirmatory pause was performed.  Discussed procedure including risks and potential complications.  Questions answered.  Patient verbalizes understanding and wishes to proceed with VCTE.     Procedure: After providing explanations of the procedure, patient was placed in the supine position with right arm in maximum abduction to allow optimal exposure of right lateral abdomen.  Patient was briefly assessed, Testing was performed in the mid-axillary location, 50Hz Shear Wave pulses were applied and the resulting Shear Wave and Propagation Speed detected with a 3.5 MHz ultrasonic signal, using the FibroScan probe, Skin to liver capsule distance and liver parenchyma were accessed during the entire examination with the FibroScan probe, Patient was instructed to breathe normally and to abstain from sudden movements during the procedure, allowing for random measurements of liver stiffness. At least 10 Shear Waves were produced, Individual measurements of each Shear Wave were calculated.  Patient tolerated the procedure well with no complications.  Meets discharge criteria as was dismissed.  Rates pain 0 out of 10.  Patient will follow up with ordering provider to review results.    Findings  Median liver stiffness score:  7.7  CAP Reading: dB/m:  173    IQR/med %:  21  Interpretation  Fibrosis interpretation is based on medial liver stiffness - Kilopascal (kPa).    Fibrosis Stage:  F 0-1  Steatosis interpretation is based on controlled attenuation parameter - (dB/m).    Steatosis Grade:  <S1

## 2024-07-02 ENCOUNTER — HOSPITAL ENCOUNTER (OUTPATIENT)
Dept: RADIOLOGY | Facility: HOSPITAL | Age: 54
Discharge: HOME | End: 2024-07-02
Attending: INTERNAL MEDICINE
Payer: MEDICAID

## 2024-07-02 DIAGNOSIS — K83.01 PSC (PRIMARY SCLEROSING CHOLANGITIS): ICD-10-CM

## 2024-07-02 PROCEDURE — 74181 MRI ABDOMEN W/O CONTRAST: CPT | Mod: TC,PO

## 2024-09-24 ENCOUNTER — TELEPHONE (OUTPATIENT)
Dept: HEPATOLOGY | Facility: CLINIC | Age: 54
End: 2024-09-24
Payer: MEDICAID

## 2024-09-24 NOTE — TELEPHONE ENCOUNTER
----- Message from Ariana Aldana sent at 9/24/2024  1:07 PM CDT -----  Regarding: call back  Contact: [phone]  Pt calling in requesting call back regarding apt please call to discuss  further

## 2024-09-24 NOTE — TELEPHONE ENCOUNTER
Spoke with patient. Appointment scheduled as VV on 10/30. Patient verbalized understanding. Appointment letter mailed to patient address on file.

## 2024-10-23 ENCOUNTER — PATIENT MESSAGE (OUTPATIENT)
Dept: RESEARCH | Facility: HOSPITAL | Age: 54
End: 2024-10-23
Payer: MEDICAID

## 2024-10-30 ENCOUNTER — OFFICE VISIT (OUTPATIENT)
Dept: HEPATOLOGY | Facility: CLINIC | Age: 54
End: 2024-10-30
Payer: MEDICAID

## 2024-10-30 ENCOUNTER — TELEPHONE (OUTPATIENT)
Dept: HEPATOLOGY | Facility: CLINIC | Age: 54
End: 2024-10-30

## 2024-10-30 DIAGNOSIS — K83.01 PSC (PRIMARY SCLEROSING CHOLANGITIS): Primary | ICD-10-CM

## 2024-10-30 PROCEDURE — 99215 OFFICE O/P EST HI 40 MIN: CPT | Mod: 95,,, | Performed by: INTERNAL MEDICINE

## 2024-10-30 PROCEDURE — G2211 COMPLEX E/M VISIT ADD ON: HCPCS | Mod: 95,,, | Performed by: INTERNAL MEDICINE

## 2024-12-09 ENCOUNTER — OFFICE VISIT (OUTPATIENT)
Dept: PODIATRY | Facility: CLINIC | Age: 54
End: 2024-12-09
Payer: MEDICAID

## 2024-12-09 DIAGNOSIS — M72.2 PLANTAR FASCIITIS OF RIGHT FOOT: Primary | ICD-10-CM

## 2024-12-09 PROCEDURE — 99999 PR PBB SHADOW E&M-EST. PATIENT-LVL III: CPT | Mod: PBBFAC,,, | Performed by: PODIATRIST

## 2024-12-09 PROCEDURE — 1159F MED LIST DOCD IN RCRD: CPT | Mod: CPTII,,, | Performed by: PODIATRIST

## 2024-12-09 PROCEDURE — 99213 OFFICE O/P EST LOW 20 MIN: CPT | Mod: PBBFAC,PO | Performed by: PODIATRIST

## 2024-12-09 PROCEDURE — 99213 OFFICE O/P EST LOW 20 MIN: CPT | Mod: S$PBB,,, | Performed by: PODIATRIST

## 2024-12-09 PROCEDURE — 1160F RVW MEDS BY RX/DR IN RCRD: CPT | Mod: CPTII,,, | Performed by: PODIATRIST

## 2024-12-09 RX ORDER — METHYLPREDNISOLONE 4 MG/1
TABLET ORAL
Qty: 1 EACH | Refills: 0 | Status: SHIPPED | OUTPATIENT
Start: 2024-12-09 | End: 2024-12-30

## 2024-12-09 NOTE — PATIENT INSTRUCTIONS
1. Stretch calf and plantar fascia at least 3x per day for 30 sec and before getting out of bed.    2. Supportive shoes at all times (athletic shoe including diaz, new balance, asics, HOKA or casual shoes like Dansko, Lisa, Naot, Vionoic, Fit flop  clog or wedge with extra heel padding and arch support. For house slippers would recommend Fitflop or Spenco found on amazon.com, Never walk barefoot or in flats.    (Varsity sports, Phidippides, [place] running company, Masseys, Goodfeet, Cantilever, Feet First, Foot Solutions, Therapeutic shoes, SAS, Face to Face [facility] [employer] pro shop) http://www.BrandBoards.MoneyMenttor/    3. Orthotic (recommend the following brands: Superfeet, Spenco, Powerstep, Sof Sole Fit Series)        4. Medrol Dose pack    5. ICE massage with frozen water bottle 2x per day for 30 minutes.    6. Consider night splint, custom orthotics, therapy and/or steroid injection    What Is Plantar Fasciitis?   The plantar fascia is a ligament-like band running from your heel to the ball of your foot. This band pulls on the heel bone, raising the arch of your foot as it pushes off the ground. But if your foot moves incorrectly, the plantar fascia may become strained. The fascia may swell and its tiny fibers may begin to fray, causing plantar fasciitis.  Causes  Plantar fasciitis is often caused by poor foot mechanics. If your foot flattens too much, the fascia may overstretch and swell. If your foot flattens too little, the fascia may ache from being pulled too tight.    The plantar fascia is a thick, fibrous layer of tissue that covers the bones on the bottom of your foot. It holds the foot bones in an arched position. Plantar fasciitis is a painful swelling of the plantar fascia.  A heel spur is an overgrowth of bone where the plantar fascia attaches to the heel bone. The heel spur itself usually doesnt cause pain. However, the heel spur might be a sign of plantar fasciitis which may cause your foot pain. There  is no specific treatment for heel spurs.   Plantar fasciitis can develop slowly or suddenly. It usually affects one foot at a time. Heel pain can feel sharp, like a knife sticking into the bottom of your foot. You may feel pain after exercising, long-distance jogging, stair climbing, long periods of standing, or after standing up.  Risk factors for plantar fasciitis include: arthritis, diabetes, obesity or recent weight gain, flat foot, and having high arches. Wearing high heels, loose shoes, or shoes with poor arch support adds to the risk.    Foot pain is usually worse in the morning. But it improves with walking. By the end of the day there may be a dull aching. Treatment includes short-term rest and controlling inflammation. It may take up to 9 months before all symptoms go away. In rare cases, a steroid injection in the foot, or surgery, may be needed.  Home care  If you are overweight, lose weight to help healing.  Choose supportive shoes with good arch support and shock absorbency. Replace athletic shoes when they become worn out. Dont walk or run barefoot.  Premade or custom-fitted shoe inserts may be helpful. Inserts made of silicone seem to be the most effective. Custom-made inserts can be provided by a podiatrist or foot specialist, physical therapist, or orthopedist.  Premade or custom-made night splints keep the heel stretched out while you sleep. They may prevent morning pain.  Avoid activities that stress the feet: jogging, prolonged standing or walking, contact sports, etc.  First thing in the morning and before sports, stretch the bottom of your foot. Gently flex your ankle so the toes move toward your knee.  Icing may help control heel pain. Apply an ice pack to the heel for 10-20 minutes as a preventive. Or ice your heel after a severe flare-up of symptoms. You may repeat this every 1-2 hours as needed.  You may use over-the-counter pain medicine to control pain, unless another medicine was  prescribed. Anti-inflammatory pain medicines, such as ibuprofen or naproxen, may work better than acetaminophen. If you have chronic liver or kidney disease or ever had a stomach ulcer or GI bleeding, talk with your healthcare provider before using these medicines.  Shoe inserts, a night splint, or a special boot may be needed. Use these as directed by your healthcare provider.      Treating Plantar Fasciitis    First, your doctor relieves pain. Then, the cause of your problem may be found and corrected. If your pain is due to poor foot mechanics, custom-made shoe inserts (orthoses) may help.        Reduce Symptoms:  To relieve mild symptoms, try aspirin, ibuprofen, or other medications as directed. Rubbing ice on the affected area may also help.  To reduce severe pain and swelling, your doctor may prescribe pills or injections or a walking cast in some instances. Physical therapy, such as ultrasound or a daily stretching program, may also be recommended. Surgery is rarely required.  To reduce symptoms caused by poor foot mechanics, your foot may be taped. This supports the arch and temporarily controls movement. Night splints may also help by stretching the fascia.    Control Movement  If taping helps, your doctor may prescribe orthoses. Built from plaster casts of your feet, these inserts control the way your foot moves. As a result, your symptoms should go away.  If Surgery Is Needed  Your doctor may consider surgery if other types of treatment don't control your pain. During surgery, the plantar fascia is partially cut to release tension. As you heal, fibrous tissue fills the space between the heel bone and the plantar fascia.   Reduce Overuse  Every time your foot strikes the ground, the plantar fascia is stretched. You can reduce the strain on the plantar fascia and the possibility of overuse by following these suggestions:  Lose any excess weight.  Avoid running on hard or uneven ground.  Use orthoses at all  times in your shoes and house slippers.  © 0479-0260 [employer]. [address] [place] [address]. All rights reserved. This information is not intended as a substitute for professional medical care. Always follow your healthcare professional's instructions.            _                Lower Body Exercises: Calf Stretch    This exercise both stretches and strengthens your lower body to help your back. Do the exercise as often as suggested by your health care provider. As you work out, dont rush or strain. Use an exercise mat, pillow, or folded towel to protect your knees and other sensitive areas.  Face a wall 2 feet away. Step toward the wall with one foot.  Place both palms on the wall and bend your front knee.  Lean forward, keeping the back leg straight and the heel on the floor.  Hold for 20 seconds. Switch legs.  © 0558-9285 [employer]. [address] [place] [address]. All rights reserved. This information is not intended as a substitute for professional medical care. Always follow your healthcare professional's instructions.    These instructions are for your right foot. Switch sides for your left foot.  Sit in a chair. Rest your right ankle on your left knee.  Hold your toes with your right hand. Gently bend the toes backward. Feel a stretch in the undersides of the toes and ball of the foot. Hold for 30 to 60 seconds.  Then gently bend the toes in the other direction. Gently press on them until your foot is pointed. Hold for 30 to 60 seconds.  Repeat 5 times, or as instructed.  Date Last Reviewed: 5/1/2016  © 8136-2867 [employer]. [address] [place] [address]. All rights reserved. This information is not intended as a substitute for professional medical care. Always follow your healthcare professional's instructions.

## 2024-12-09 NOTE — PROGRESS NOTES
Subjective:      Patient ID: Erik Saravia is a 54 y.o. male.    Chief Complaint: right heel pain      Erik is a 54 y.o. male    12/9/24: patient presents for right heel pain, relates in his inserts and work boots there is not much pain but when he gets home and rests for a bit and gets back up on it there is sharp pain that is getting gradually worse. Wears power step inserts in his boot    Review of Systems   Constitutional: Negative for chills and fever.   Cardiovascular:  Negative for claudication and leg swelling.   Respiratory:  Negative for shortness of breath.    Skin:  Positive for nail changes. Negative for itching and rash.   Musculoskeletal:  Negative for muscle cramps, muscle weakness and myalgias.   Gastrointestinal:  Negative for nausea and vomiting.   Neurological:  Negative for focal weakness, loss of balance, numbness and paresthesias.           Objective:      Physical Exam  Constitutional:       General: He is not in acute distress.     Appearance: He is well-developed. He is not diaphoretic.   Cardiovascular:      Pulses:           Dorsalis pedis pulses are 2+ on the right side and 2+ on the left side.        Posterior tibial pulses are 2+ on the right side and 2+ on the left side.      Comments: < 3 sec capillary refill time to toes 1-5 bilateral. Toes and feet are warm to touch proximally with normal distal cooling b/l. There is some hair growth on the feet and toes b/l. There is no edema b/l. No spider veins or varicosities present b/l.     Musculoskeletal:      Comments: Equinus noted b/l ankles with < 10 deg DF noted. MMT 5/5 in DF/PF/Inv/Ev resistance with no reproduction of pain in any direction. Passive range of motion of ankle and pedal joints is painless b/l.    Medial arch collapse with weight bearing bilateral worse on the right, there is pain to the right PT tendon distally and there is pain with single heel rise right foot.     Pain on palpation plantar medial and central heel  right foot. No pain with ROM or MMT. No pain with medial and lateral compression of heel.       Skin:     General: Skin is warm and dry.      Coloration: Skin is not pale.      Findings: No abrasion, bruising, burn, ecchymosis, erythema, laceration, lesion, petechiae or rash.      Nails: There is no clubbing.      Comments: Skin temperature, texture and turgor within normal limits.   Neurological:      Mental Status: He is alert and oriented to person, place, and time.      Sensory: No sensory deficit.      Motor: No tremor, atrophy or abnormal muscle tone.      Comments: Negative tinel sign bilateral.   Psychiatric:         Behavior: Behavior normal.               Assessment:       Encounter Diagnosis   Name Primary?    Plantar fasciitis of right foot Yes             Plan:       Erik was seen today for right heel pain.    Diagnoses and all orders for this visit:    Plantar fasciitis of right foot    Other orders  -     methylPREDNISolone (MEDROL DOSEPACK) 4 mg tablet; use as directed          I counseled the patient on his conditions, their implications and medical management.    Patient will continue to wear over the counter arch supports and wear them in shoes whenever possible.  Athletic shoes intended for walking or running are usually best. Even when at home wear the shoes and inserts or more supportive shoes    Patient will stretch the tendo achilles complex three times daily as demonstrated in the office.  Literature was dispensed illustrating proper stretching technique.    Consider steroid injections    Medrol dose pack for inflammation        Return 6 weeks for follow up    Jaswant Garner DPM

## 2025-02-24 ENCOUNTER — OFFICE VISIT (OUTPATIENT)
Dept: PODIATRY | Facility: CLINIC | Age: 55
End: 2025-02-24
Payer: MEDICAID

## 2025-02-24 VITALS — HEIGHT: 71 IN | WEIGHT: 202.19 LBS | BODY MASS INDEX: 28.31 KG/M2

## 2025-02-24 DIAGNOSIS — M72.2 PLANTAR FASCIITIS OF RIGHT FOOT: Primary | ICD-10-CM

## 2025-02-24 PROCEDURE — 99999 PR PBB SHADOW E&M-EST. PATIENT-LVL I: CPT | Mod: PBBFAC,,, | Performed by: PODIATRIST

## 2025-02-24 PROCEDURE — 1160F RVW MEDS BY RX/DR IN RCRD: CPT | Mod: CPTII,,, | Performed by: PODIATRIST

## 2025-02-24 PROCEDURE — 99211 OFF/OP EST MAY X REQ PHY/QHP: CPT | Mod: PBBFAC,PO | Performed by: PODIATRIST

## 2025-02-24 PROCEDURE — 99212 OFFICE O/P EST SF 10 MIN: CPT | Mod: S$PBB,,, | Performed by: PODIATRIST

## 2025-02-24 PROCEDURE — 1159F MED LIST DOCD IN RCRD: CPT | Mod: CPTII,,, | Performed by: PODIATRIST

## 2025-02-24 NOTE — PROGRESS NOTES
Subjective:      Patient ID: Erik Saravia is a 55 y.o. male.    Chief Complaint: No chief complaint on file.      Erik is a 55 y.o. male    12/9/24: patient presents for right heel pain, relates in his inserts and work boots there is not much pain but when he gets home and rests for a bit and gets back up on it there is sharp pain that is getting gradually worse. Wears power step inserts in his boot    2/24/25: Patient returns for follow up heel pain, relates the pain is mostly gone wearing the inserts and doing the stretching has helped. He had a fall with dorsal foot pain discoloration and a lump, these have since resolved well with minimal pain today    Review of Systems   Constitutional: Negative for chills and fever.   Cardiovascular:  Negative for claudication and leg swelling.   Respiratory:  Negative for shortness of breath.    Skin:  Positive for nail changes. Negative for itching and rash.   Musculoskeletal:  Negative for muscle cramps, muscle weakness and myalgias.   Gastrointestinal:  Negative for nausea and vomiting.   Neurological:  Negative for focal weakness, loss of balance, numbness and paresthesias.           Objective:      Physical Exam  Constitutional:       General: He is not in acute distress.     Appearance: He is well-developed. He is not diaphoretic.   Cardiovascular:      Pulses:           Dorsalis pedis pulses are 2+ on the right side and 2+ on the left side.        Posterior tibial pulses are 2+ on the right side and 2+ on the left side.      Comments: < 3 sec capillary refill time to toes 1-5 bilateral. Toes and feet are warm to touch proximally with normal distal cooling b/l. There is some hair growth on the feet and toes b/l. There is no edema b/l. No spider veins or varicosities present b/l.     Musculoskeletal:      Comments: Equinus noted b/l ankles with < 10 deg DF noted. MMT 5/5 in DF/PF/Inv/Ev resistance with no reproduction of pain in any direction. Passive range of  motion of ankle and pedal joints is painless b/l.    Medial arch collapse with weight bearing bilateral worse on the right, there is pain to the right PT tendon distally and there is pain with single heel rise right foot.     No longer has Pain on palpation plantar medial and central heel right foot. No pain with ROM or MMT. No pain with medial and lateral compression of heel.       Skin:     General: Skin is warm and dry.      Coloration: Skin is not pale.      Findings: No abrasion, bruising, burn, ecchymosis, erythema, laceration, lesion, petechiae or rash.      Nails: There is no clubbing.      Comments: Skin temperature, texture and turgor within normal limits.   Neurological:      Mental Status: He is alert and oriented to person, place, and time.      Sensory: No sensory deficit.      Motor: No tremor, atrophy or abnormal muscle tone.      Comments: Negative tinel sign bilateral.   Psychiatric:         Behavior: Behavior normal.               Assessment:       Encounter Diagnosis   Name Primary?    Plantar fasciitis of right foot Yes               Plan:       Diagnoses and all orders for this visit:    Plantar fasciitis of right foot            I counseled the patient on his conditions, their implications and medical management.    Patient will continue to wear over the counter arch supports and wear them in shoes whenever possible.  Athletic shoes intended for walking or running are usually best. Even when at home wear the shoes and inserts or more supportive shoes    Patient will stretch the tendo achilles complex three times daily as demonstrated in the office.  Literature was dispensed illustrating proper stretching technique.      Doing well overall    Return PRN    Jaswant Garner DPM

## 2025-05-02 ENCOUNTER — TELEPHONE (OUTPATIENT)
Dept: HEPATOLOGY | Facility: CLINIC | Age: 55
End: 2025-05-02
Payer: MEDICAID

## 2025-05-02 DIAGNOSIS — K83.01 PSC (PRIMARY SCLEROSING CHOLANGITIS): Primary | ICD-10-CM

## 2025-05-02 NOTE — TELEPHONE ENCOUNTER
----- Message from NAZANIN Matthews sent at 5/2/2025  1:57 PM CDT -----  Kyara Tompkins,Orders are in.Thanks Again,Cassie  ----- Message -----  From: Turner Adame MA  Sent: 5/2/2025  11:39 AM CDT  To: Cassie Maurice RN    Ms. Lynch, please put a new order for MRI.  Thank you  ----- Message -----  From: Anisha Gonzalez  Sent: 5/2/2025  11:19 AM CDT  To: Kimmy Castillo Staff    Type:  Needs Medical AdviceWho Called: ptSymptoms (please be specific): na How long has patient had these symptoms:  naPharmacy name and phone #:  naWould the patient rather a call back or a response via [facility]? Call 51edj Call Back Number: [phone] Information: pt is needing MRI ABD W/WO & MRCP [[id number]] order put back in the system. He was supposed to get it done on 4/22 but he ended up in the ER. The blood work is in the system ready to be scheduled but for some reason the MRI did not go back to being in the active orders. Please call back to advise. Thanks!

## 2025-05-02 NOTE — TELEPHONE ENCOUNTER
Call returned to the patient.  Scheduled MRI, labs and FU.  Patient confirmed and agreed with the appt.  Reminder letter mailed.

## 2025-05-19 ENCOUNTER — HOSPITAL ENCOUNTER (OUTPATIENT)
Dept: RADIOLOGY | Facility: HOSPITAL | Age: 55
Discharge: HOME | End: 2025-05-19
Attending: INTERNAL MEDICINE
Payer: MEDICAID

## 2025-05-19 DIAGNOSIS — K83.01 PSC (PRIMARY SCLEROSING CHOLANGITIS): ICD-10-CM

## 2025-05-19 PROCEDURE — 76376 3D RENDER W/INTRP POSTPROCES: CPT | Mod: TC,PO

## 2025-05-19 PROCEDURE — 25500020 PHARM REV CODE 255: Mod: PO | Performed by: INTERNAL MEDICINE

## 2025-05-19 PROCEDURE — A9585 GADOBUTROL INJECTION: HCPCS | Mod: PO | Performed by: INTERNAL MEDICINE

## 2025-05-19 RX ORDER — GADOBUTROL 604.72 MG/ML
9 INJECTION INTRAVENOUS
Status: COMPLETED | OUTPATIENT
Start: 2025-05-19 | End: 2025-05-19

## 2025-05-19 RX ADMIN — GADOBUTROL 9 ML: 604.72 INJECTION INTRAVENOUS at 04:05

## 2025-05-23 ENCOUNTER — TELEPHONE (OUTPATIENT)
Dept: HEPATOLOGY | Facility: CLINIC | Age: 55
End: 2025-05-23
Payer: MEDICAID

## 2025-05-23 ENCOUNTER — RESULTS FOLLOW-UP (OUTPATIENT)
Dept: TRANSPLANT | Facility: CLINIC | Age: 55
End: 2025-05-23
Payer: MEDICAID

## 2025-05-23 ENCOUNTER — PATIENT MESSAGE (OUTPATIENT)
Dept: TRANSPLANT | Facility: CLINIC | Age: 55
End: 2025-05-23
Payer: MEDICAID

## 2025-05-26 ENCOUNTER — CONFERENCE (OUTPATIENT)
Dept: TRANSPLANT | Facility: CLINIC | Age: 55
End: 2025-05-26
Payer: MEDICAID

## 2025-05-26 NOTE — TELEPHONE ENCOUNTER
Patient: Erik Saravia       MRN: [id number]      : 1970     Age: 55 y.o.  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    Ca 19 9 =467  MRCP- abnormal    Imaging to be reviewed: MRCP 2025    HCC Treatment History: none    Platelets:   Lab Results   Component Value Date/Time     2025 03:14 PM     2025 04:14 PM     Creatinine:   Lab Results   Component Value Date/Time    CREATININE 1.0 2025 03:14 PM     Bilirubin:   Lab Results   Component Value Date/Time    BILITOT 0.8 2025 03:14 PM    BILITOT 0.4 2025 04:14 PM     AFP Last 3 each if available:   Lab Results   Component Value Date/Time    AFP 3.6 2025 03:14 PM    AFP 3.5 2024 03:45 PM    AFP 5.1 2010 04:10 PM    AFP 4.5 2009 10:47 AM       MELD: MELD 3.0: 6 at 2025  3:14 PM  MELD-Na: 6 at 2025  3:14 PM  Calculated from:  Serum Creatinine: 1 mg/dL at 2025  3:14 PM  Serum Sodium: 138 mmol/L (Using max of 137 mmol/L) at 2025  3:14 PM  Total Bilirubin: 0.8 mg/dL (Using min of 1 mg/dL) at 2025  3:14 PM  Serum Albumin: 3.7 g/dL (Using max of 3.5 g/dL) at 2025  3:14 PM  INR(ratio): 1 at 2025  3:14 PM  Age at listing (hypothetical): 55 years  Sex: Male at 2025  3:14 PM    IR Discussion/Plan:  MRI 25: Multifocal beading of intrahepatic bile ducts in keeping with patient's known PSC. Focal areas within segments 6 and 8 of dilated intrahepatic bile ducts with associated perfusional abnormalities, likely related to obstruction and possible superimposed cholangitis. These findings are similar/slightly worsened from 24 study.     Committee Discussion:  There is no evidence of liver lesion. There are perfusion  changes related to cholangitis. Ductals dilation is slightly worse. Segment 6 is more suspicious than segment 8.     Plan:   Repeat Ca 19-9  ERCP    Follow-up Provider: Dr Oneal

## 2025-05-27 ENCOUNTER — OFFICE VISIT (OUTPATIENT)
Dept: HEPATOLOGY | Facility: CLINIC | Age: 55
End: 2025-05-27
Payer: MEDICAID

## 2025-05-27 DIAGNOSIS — K83.01 PRIMARY SCLEROSING CHOLANGITIS: Primary | ICD-10-CM

## 2025-05-27 PROCEDURE — 98006 SYNCH AUDIO-VIDEO EST MOD 30: CPT | Mod: 95,,, | Performed by: INTERNAL MEDICINE

## 2025-05-27 PROCEDURE — G2211 COMPLEX E/M VISIT ADD ON: HCPCS | Mod: 95,,, | Performed by: INTERNAL MEDICINE

## 2025-05-27 NOTE — PROGRESS NOTES
Subjective:       Patient ID: Erik Saravia is a 55 y.o. male.    Chief Complaint: No chief complaint on file.  The patient location is: [place]  The chief complaint leading to consultation is: PSC    Visit type: audiovisual    Face to Face time with patient: 20 minutes of total time spent on the encounter, which includes face to face time and non-face to face time preparing to see the patient (eg, review of tests), Obtaining and/or reviewing separately obtained history, Documenting clinical information in the electronic or other health record, Independently interpreting results (not separately reported) and communicating results to the patient/family/caregiver, or Care coordination (not separately reported).       Each patient to whom he or she provides medical services by telemedicine is:  (1) informed of the relationship between the physician and patient and the respective role of any other health care provider with respect to management of the patient; and (2) notified that he or she may decline to receive medical services by telemedicine and may withdraw from such care at any time.    Notes:    HPI  I saw this 55 y.o. man with PSC who previously saw Dr Becerra in 2018 and 2013.    ? 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    Previous MRI/MRCP showed normal biliary tree (last one in Nov 2021).  Last had an ERCP in 2008-     2 flares of PSC with feversand hospital admissions- last one in April 2025.    MRI abdo: 12/30/22  Geographic, patchy distribution of a hepatic steatosis.     MRCP: 7/2/24  1. Imaging findings which could relate to the provided history of primary sclerosing cholangitis, most prominently affecting the intrahepatic biliary system within segment VIII of the liver.  No discrete solid hepatic mass identified.  2. Probable focal fatty infiltration of the liver involving segment VI of the liver.  3. 4 mm probable cyst in the upper pole  cortex of the right kidney.    MRI/MRCP: 5/19/25  Worsening region of segmental ectatic intra hepatic bile ducts with wall thickening and progressive enhancement within the posterior hepatic segment 6, most likely sequelae of cholangitis/infection. Recommend short-term imaging follow-up to exclude neoplasm.     Frequent episodes of fever/chills that require antibiotics and are associated with LFT elevations.    Under regular follow up by Dr uStton for his UC- on vamcomycin alone.      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   HENT:  Negative for hearing loss.    Eyes:  Negative for discharge and visual disturbance.   Respiratory:  Negative for cough, chest tightness, shortness of breath and wheezing.    Cardiovascular:  Negative for chest pain, palpitations and leg swelling.   Gastrointestinal:  Negative for abdominal distention, abdominal pain, constipation, diarrhea and nausea.   Genitourinary:  Negative for dysuria and frequency.   Musculoskeletal:  Negative for arthralgias and back pain.   Skin:  Negative for pallor and rash.   Neurological:  Negative for dizziness, tremors, speech difficulty and headaches.   Hematological:  Negative for adenopathy.   Psychiatric/Behavioral:  Negative for agitation and confusion.            Lab Results   Component Value Date    ALT 35 05/19/2025    AST 34 05/19/2025     (H) 02/24/2016    ALKPHOS 80 05/19/2025    BILITOT 0.8 05/19/2025     Past Medical History:   Diagnosis Date    Allergy     Anemia     Asthma     External hemorrhoids with other complication 5/8/2011    General anesthetics causing adverse effect in therapeutic use     History of recent hospitalization     [facility]: 9/23/2016 to 9/24/2016: Fever and UC flare    History of  recent hospitalization     ST: 10/29/2016 to 10/31/2016 for fever and UC flare    Liver disease     Migraines     Pancreatitis     Primary sclerosing cholangitis     Ulcerative colitis      Past Surgical History:   Procedure Laterality Date    CHOLECYSTECTOMY      COLONOSCOPY Left 12/30/2015    Procedure: COLONOSCOPY;  Surgeon: Kamaljit Sutton MD;  Location: [facility] ENDO;  Service: Endoscopy;  Laterality: Left;    COLONOSCOPY N/A 5/19/2017    Procedure: COLONOSCOPY;  Surgeon: Kamaljit Sutton MD;  Location: [facility] ENDO;  Service: Endoscopy;  Laterality: N/A;    COLONOSCOPY N/A 3/8/2019    Procedure: COLONOSCOPY;  Surgeon: Kamaljit Sutton MD;  Location: [facility] ENDO;  Service: Endoscopy;  Laterality: N/A;    ERCP      temporary stent    ESOPHAGEAL DILATION N/A 3/8/2019    Procedure: DILATION, ESOPHAGUS;  Surgeon: Kamaljit Sutton MD;  Location: [facility];  Service: Endoscopy;  Laterality: N/A;    ESOPHAGOGASTRODUODENOSCOPY N/A 3/8/2019    Procedure: EGD (ESOPHAGOGASTRODUODENOSCOPY);  Surgeon: Kamaljit Sutton MD;  Location: [facility];  Service: Endoscopy;  Laterality: N/A;    Excision of thrombosed hemorrhoid  09/2016    SKIN BIOPSY      tonsillectomy      TONSILLECTOMY       Current Outpatient Medications   Medication Sig    ascorbic acid (VITAMIN C ORAL) Take 1 tablet by mouth once daily.    BIFIDOBACTERIUM INFANTIS (ALIGN ORAL) Take 1 capsule by mouth twice a week.    cholecalciferol, vitamin D3, (VITAMIN D3 ORAL) Take 1 capsule by mouth once daily.    copper gluconate 2 mg Tab Take by mouth.    dicyclomine (BENTYL) 10 MG capsule Take 10 mg by mouth daily as needed (abdominal cramps).    glycerin/witch hazel leaf (HEMORRHOID TOP) Apply topically daily as needed (hemorrhoids). Cream - otc    ondansetron (ZOFRAN-ODT) 4 MG TbDL Take 2 tablets (8 mg total) by mouth every 6 (six) hours as needed (naiusea).    pantoprazole (PROTONIX) 40 MG tablet Take 40 mg by mouth every morning.    ubrogepant (UBROGEPANT)  50 mg tablet Take 50 mg by mouth once as needed for Migraine. May repeat in 1 hour if needed but not more than 2 doses in a 24 hour period    vancomycin (VANCOCIN) 250 MG capsule Take 1,000 mg by mouth once daily.    ZINC ORAL Take 1 tablet by mouth once daily.     No current facility-administered medications for this visit.       Objective:    NOT DONE- VIDEO VISIT      Assessment:       No diagnosis found.      Plan:   This man with a diagnosis of PSC seems to have episodes that may be related to cholangitis about 1-2 x per year and requires antibiotics.  Previously intolerant of UDCA bt would be willing to try it again at a lower dose.    LFTs were normal in Sep 2024- after his episode of cholangitis  Immune to HAV/HBV    Some tremors x2 months- saw neurology. Low level copper  - Fibroscan July 2024=  S0/F1    Issues:  Ca 19 9 is now very elevated ? Related to recent cholangitis  - will repeat    MRCP- reviewed in IR conference on 5/27/25- no obvious deterioration    Obviously the Ca 19 9 is concerning so I will arrange for an ERCP + repeat level to be drawn.  Patient agress with this plan    Clinic in 4 months

## 2025-05-28 ENCOUNTER — TELEPHONE (OUTPATIENT)
Dept: HEPATOLOGY | Facility: CLINIC | Age: 55
End: 2025-05-28
Payer: MEDICAID

## 2025-05-28 NOTE — TELEPHONE ENCOUNTER
----- Message from Jonathan Oneal MD sent at 5/28/2025 11:05 AM CDT -----  Can you please set up the labs for next week?

## 2025-05-29 ENCOUNTER — TELEPHONE (OUTPATIENT)
Dept: ENDOSCOPY | Facility: HOSPITAL | Age: 55
End: 2025-05-29
Payer: MEDICAID

## 2025-05-29 DIAGNOSIS — R93.89 ABNORMAL FINDING ON IMAGING: Primary | ICD-10-CM

## 2025-05-29 NOTE — TELEPHONE ENCOUNTER
Per Dr. Reece Team, please schedule ERCP for PSC and abnormal MRCP, needs brushings but likely not a stent ERIS or Kirt

## 2025-06-04 ENCOUNTER — LAB VISIT (OUTPATIENT)
Dept: LAB | Facility: HOSPITAL | Age: 55
End: 2025-06-04
Attending: INTERNAL MEDICINE
Payer: MEDICAID

## 2025-06-04 DIAGNOSIS — K83.01 PRIMARY SCLEROSING CHOLANGITIS: ICD-10-CM

## 2025-06-04 LAB
ABSOLUTE EOSINOPHIL (OHS): 0.2 K/UL
ABSOLUTE MONOCYTE (OHS): 0.71 K/UL (ref 0.3–1)
ABSOLUTE NEUTROPHIL COUNT (OHS): 4.04 K/UL (ref 1.8–7.7)
AFP SERPL-MCNC: 3 NG/ML
ALBUMIN SERPL BCP-MCNC: 3.9 G/DL (ref 3.5–5.2)
ALP SERPL-CCNC: 74 UNIT/L (ref 40–150)
ALT SERPL W/O P-5'-P-CCNC: 33 UNIT/L (ref 10–44)
ANION GAP (OHS): 7 MMOL/L (ref 8–16)
AST SERPL-CCNC: 32 UNIT/L (ref 11–45)
BASOPHILS # BLD AUTO: 0.08 K/UL
BASOPHILS NFR BLD AUTO: 1.2 %
BILIRUB SERPL-MCNC: 0.6 MG/DL (ref 0.1–1)
BUN SERPL-MCNC: 11 MG/DL (ref 6–20)
CALCIUM SERPL-MCNC: 9.2 MG/DL (ref 8.7–10.5)
CANCER AG19-9 SERPL-ACNC: 572.3 U/ML
CHLORIDE SERPL-SCNC: 106 MMOL/L (ref 95–110)
CO2 SERPL-SCNC: 27 MMOL/L (ref 23–29)
CREAT SERPL-MCNC: 1 MG/DL (ref 0.5–1.4)
ERYTHROCYTE [DISTWIDTH] IN BLOOD BY AUTOMATED COUNT: 15.2 % (ref 11.5–14.5)
GFR SERPLBLD CREATININE-BSD FMLA CKD-EPI: >60 ML/MIN/1.73/M2
GLUCOSE SERPL-MCNC: 86 MG/DL (ref 70–110)
HCT VFR BLD AUTO: 45.8 % (ref 40–54)
HGB BLD-MCNC: 14.8 GM/DL (ref 14–18)
IMM GRANULOCYTES # BLD AUTO: 0.03 K/UL (ref 0–0.04)
IMM GRANULOCYTES NFR BLD AUTO: 0.4 % (ref 0–0.5)
LYMPHOCYTES # BLD AUTO: 1.71 K/UL (ref 1–4.8)
MCH RBC QN AUTO: 27.7 PG (ref 27–31)
MCHC RBC AUTO-ENTMCNC: 32.3 G/DL (ref 32–36)
MCV RBC AUTO: 86 FL (ref 82–98)
NUCLEATED RBC (/100WBC) (OHS): 0 /100 WBC
PLATELET # BLD AUTO: 296 K/UL (ref 150–450)
PMV BLD AUTO: 10.7 FL (ref 9.2–12.9)
POTASSIUM SERPL-SCNC: 4.4 MMOL/L (ref 3.5–5.1)
PROT SERPL-MCNC: 6.5 GM/DL (ref 6–8.4)
RBC # BLD AUTO: 5.34 M/UL (ref 4.6–6.2)
RELATIVE EOSINOPHIL (OHS): 3 %
RELATIVE LYMPHOCYTE (OHS): 25.3 % (ref 18–48)
RELATIVE MONOCYTE (OHS): 10.5 % (ref 4–15)
RELATIVE NEUTROPHIL (OHS): 59.6 % (ref 38–73)
SODIUM SERPL-SCNC: 140 MMOL/L (ref 136–145)
WBC # BLD AUTO: 6.77 K/UL (ref 3.9–12.7)

## 2025-06-04 PROCEDURE — 86301 IMMUNOASSAY TUMOR CA 19-9: CPT

## 2025-06-04 PROCEDURE — 36415 COLL VENOUS BLD VENIPUNCTURE: CPT | Mod: PO

## 2025-06-04 PROCEDURE — 85025 COMPLETE CBC W/AUTO DIFF WBC: CPT

## 2025-06-04 PROCEDURE — 82105 ALPHA-FETOPROTEIN SERUM: CPT

## 2025-06-04 PROCEDURE — 82040 ASSAY OF SERUM ALBUMIN: CPT

## 2025-06-24 ENCOUNTER — ANESTHESIA (OUTPATIENT)
Dept: ENDOSCOPY | Facility: HOSPITAL | Age: 55
End: 2025-06-24
Payer: MEDICAID

## 2025-06-24 ENCOUNTER — ANESTHESIA EVENT (OUTPATIENT)
Dept: ENDOSCOPY | Facility: HOSPITAL | Age: 55
End: 2025-06-24
Payer: MEDICAID

## 2025-06-24 ENCOUNTER — HOSPITAL ENCOUNTER (OUTPATIENT)
Facility: HOSPITAL | Age: 55
Discharge: HOME | End: 2025-06-24
Attending: INTERNAL MEDICINE | Admitting: INTERNAL MEDICINE
Payer: MEDICAID

## 2025-06-24 VITALS
DIASTOLIC BLOOD PRESSURE: 94 MMHG | SYSTOLIC BLOOD PRESSURE: 155 MMHG | OXYGEN SATURATION: 97 % | RESPIRATION RATE: 20 BRPM | TEMPERATURE: 98 F | HEART RATE: 83 BPM

## 2025-06-24 DIAGNOSIS — K83.01 PRIMARY SCLEROSING CHOLANGITIS: ICD-10-CM

## 2025-06-24 DIAGNOSIS — R93.89 ABNORMAL FINDING ON IMAGING: ICD-10-CM

## 2025-06-24 DIAGNOSIS — R74.8 ELEVATED LIVER ENZYMES: Primary | ICD-10-CM

## 2025-06-24 PROCEDURE — 43260 ERCP W/SPECIMEN COLLECTION: CPT | Mod: ,,, | Performed by: INTERNAL MEDICINE

## 2025-06-24 PROCEDURE — 25000003 PHARM REV CODE 250

## 2025-06-24 PROCEDURE — 88305 TISSUE EXAM BY PATHOLOGIST: CPT | Mod: 26,,, | Performed by: PATHOLOGY

## 2025-06-24 PROCEDURE — 37000009 HC ANESTHESIA EA ADD 15 MINS: Performed by: INTERNAL MEDICINE

## 2025-06-24 PROCEDURE — 27200946 HC BRUSH, CYTOLOGY: Performed by: INTERNAL MEDICINE

## 2025-06-24 PROCEDURE — 74328 X-RAY BILE DUCT ENDOSCOPY: CPT | Mod: TC | Performed by: INTERNAL MEDICINE

## 2025-06-24 PROCEDURE — 25500020 PHARM REV CODE 255: Performed by: INTERNAL MEDICINE

## 2025-06-24 PROCEDURE — 27200976 HC DILATOR, BILIARY: Performed by: INTERNAL MEDICINE

## 2025-06-24 PROCEDURE — 37000008 HC ANESTHESIA 1ST 15 MINUTES: Performed by: INTERNAL MEDICINE

## 2025-06-24 PROCEDURE — 27201674 HC SPHINCTERTOME: Performed by: INTERNAL MEDICINE

## 2025-06-24 PROCEDURE — 43260 ERCP W/SPECIMEN COLLECTION: CPT | Performed by: INTERNAL MEDICINE

## 2025-06-24 PROCEDURE — 88305 TISSUE EXAM BY PATHOLOGIST: CPT | Mod: TC | Performed by: INTERNAL MEDICINE

## 2025-06-24 PROCEDURE — 63600175 PHARM REV CODE 636 W HCPCS

## 2025-06-24 PROCEDURE — 88112 CYTOPATH CELL ENHANCE TECH: CPT | Mod: 26,,, | Performed by: PATHOLOGY

## 2025-06-24 PROCEDURE — C1769 GUIDE WIRE: HCPCS | Performed by: INTERNAL MEDICINE

## 2025-06-24 PROCEDURE — 74328 X-RAY BILE DUCT ENDOSCOPY: CPT | Mod: 26,,, | Performed by: INTERNAL MEDICINE

## 2025-06-24 RX ORDER — PROPOFOL 10 MG/ML
VIAL (ML) INTRAVENOUS CONTINUOUS PRN
Status: DISCONTINUED | OUTPATIENT
Start: 2025-06-24 | End: 2025-06-24

## 2025-06-24 RX ORDER — ONDANSETRON HYDROCHLORIDE 2 MG/ML
4 INJECTION, SOLUTION INTRAVENOUS DAILY PRN
Status: DISCONTINUED | OUTPATIENT
Start: 2025-06-24 | End: 2025-06-24 | Stop reason: HOSPADM

## 2025-06-24 RX ORDER — LORAZEPAM 2 MG/ML
0.25 INJECTION INTRAMUSCULAR ONCE AS NEEDED
Status: DISCONTINUED | OUTPATIENT
Start: 2025-06-24 | End: 2025-06-24 | Stop reason: HOSPADM

## 2025-06-24 RX ORDER — FENTANYL CITRATE 50 UG/ML
INJECTION, SOLUTION INTRAMUSCULAR; INTRAVENOUS
Status: DISCONTINUED | OUTPATIENT
Start: 2025-06-24 | End: 2025-06-24

## 2025-06-24 RX ORDER — HYDROMORPHONE HYDROCHLORIDE 1 MG/ML
0.2 INJECTION, SOLUTION INTRAMUSCULAR; INTRAVENOUS; SUBCUTANEOUS EVERY 5 MIN PRN
Status: DISCONTINUED | OUTPATIENT
Start: 2025-06-24 | End: 2025-06-24 | Stop reason: HOSPADM

## 2025-06-24 RX ORDER — AMOXICILLIN AND CLAVULANATE POTASSIUM 875; 125 MG/1; MG/1
1 TABLET, FILM COATED ORAL EVERY 12 HOURS
Qty: 14 TABLET | Refills: 0 | Status: SHIPPED | OUTPATIENT
Start: 2025-06-24 | End: 2025-07-01

## 2025-06-24 RX ORDER — PROPOFOL 10 MG/ML
VIAL (ML) INTRAVENOUS
Status: DISCONTINUED | OUTPATIENT
Start: 2025-06-24 | End: 2025-06-24

## 2025-06-24 RX ORDER — GLUCAGON 1 MG
1 KIT INJECTION
Status: DISCONTINUED | OUTPATIENT
Start: 2025-06-24 | End: 2025-06-24 | Stop reason: HOSPADM

## 2025-06-24 RX ORDER — MEPERIDINE HYDROCHLORIDE 50 MG/ML
12.5 INJECTION INTRAMUSCULAR; INTRAVENOUS; SUBCUTANEOUS ONCE AS NEEDED
Status: DISCONTINUED | OUTPATIENT
Start: 2025-06-24 | End: 2025-06-24 | Stop reason: HOSPADM

## 2025-06-24 RX ORDER — SODIUM CHLORIDE 9 MG/ML
INJECTION, SOLUTION INTRAVENOUS CONTINUOUS
Status: DISCONTINUED | OUTPATIENT
Start: 2025-06-24 | End: 2025-06-24 | Stop reason: HOSPADM

## 2025-06-24 RX ORDER — LIDOCAINE HYDROCHLORIDE 20 MG/ML
INJECTION INTRAVENOUS
Status: DISCONTINUED | OUTPATIENT
Start: 2025-06-24 | End: 2025-06-24

## 2025-06-24 RX ADMIN — PROPOFOL 200 MCG/KG/MIN: 10 INJECTION, EMULSION INTRAVENOUS at 02:06

## 2025-06-24 RX ADMIN — SODIUM CHLORIDE: 0.9 INJECTION, SOLUTION INTRAVENOUS at 02:06

## 2025-06-24 RX ADMIN — PIPERACILLIN SODIUM,TAZOBACTAM SODIUM 3.38 G: 3; .375 INJECTION, POWDER, FOR SOLUTION INTRAVENOUS at 02:06

## 2025-06-24 RX ADMIN — LIDOCAINE HYDROCHLORIDE 100 MG: 20 INJECTION INTRAVENOUS at 02:06

## 2025-06-24 RX ADMIN — FENTANYL CITRATE 25 MCG: 50 INJECTION, SOLUTION INTRAMUSCULAR; INTRAVENOUS at 02:06

## 2025-06-24 RX ADMIN — PROPOFOL 20 MG: 10 INJECTION, EMULSION INTRAVENOUS at 02:06

## 2025-06-24 RX ADMIN — PROPOFOL 70 MG: 10 INJECTION, EMULSION INTRAVENOUS at 02:06

## 2025-06-24 RX ADMIN — PROPOFOL 10 MG: 10 INJECTION, EMULSION INTRAVENOUS at 02:06

## 2025-06-24 RX ADMIN — GLYCOPYRROLATE 0.2 MG: 0.2 INJECTION, SOLUTION INTRAMUSCULAR; INTRAVENOUS at 02:06

## 2025-06-24 NOTE — ANESTHESIA POSTPROCEDURE EVALUATION
Anesthesia Post Evaluation    Patient: Erik Saravia    Procedure(s) Performed: Procedure(s) (LRB):  ERCP (ENDOSCOPIC RETROGRADE CHOLANGIOPANCREATOGRAPHY) (N/A)    Final Anesthesia Type: general      Patient location during evaluation: PACU  Patient participation: Yes- Able to Participate  Level of consciousness: awake and alert  Post-procedure vital signs: reviewed and stable  Pain management: adequate  Airway patency: patent    PONV status at discharge: No PONV  Anesthetic complications: no      Cardiovascular status: blood pressure returned to baseline  Respiratory status: spontaneous ventilation and room air  Hydration status: euvolemic  Follow-up not needed.              Vitals Value Taken Time   /94 06/24/25 15:47   Temp 36.7 °C (98.1 °F) 06/24/25 14:47   Pulse 77 06/24/25 15:49   Resp 14 06/24/25 15:47   SpO2 96 % 06/24/25 15:49   Vitals shown include unfiled device data.      No case tracking events are documented in the log.      Pain/Kasi Score: Kasi Score: 10 (6/24/2025  3:15 PM)

## 2025-06-24 NOTE — ANESTHESIA PREPROCEDURE EVALUATION
06/24/2025  Erik Saravia is a 55 y.o., male.      Pre-op Assessment    I have reviewed the Patient Summary Reports.     I have reviewed the Nursing Notes.       Review of Systems  Anesthesia Hx:  No problems with previous Anesthesia                Hematology/Oncology:  Hematology Normal   Oncology Normal                                   EENT/Dental:  EENT/Dental Normal           Cardiovascular:  Cardiovascular Normal                                              Pulmonary:    Asthma                    Renal/:  Renal/ Normal                 Hepatic/GI:   PUD,   Liver Disease,               Musculoskeletal:  Musculoskeletal Normal                Neurological:      Headaches                                 Endocrine:  Endocrine Normal            Dermatological:  Skin Normal    Psych:  Psychiatric Normal                    Physical Exam  General: Well nourished    Airway:  Mallampati: II   Mouth Opening: Normal  TM Distance: Normal  Tongue: Normal  Neck ROM: Normal ROM    Dental:  Intact        Anesthesia Plan  Type of Anesthesia, risks & benefits discussed:    Anesthesia Type: Gen Natural Airway  Intra-op Monitoring Plan: Standard ASA Monitors  Post Op Pain Control Plan: multimodal analgesia  Induction:  IV  Airway Plan: Direct  Informed Consent: Informed consent signed with the Patient and all parties understand the risks and agree with anesthesia plan.  All questions answered. Patient consented to blood products? No  ASA Score: 3  Day of Surgery Review of History & Physical: H&P Update referred to the surgeon/provider.    Ready For Surgery From Anesthesia Perspective.     .

## 2025-06-24 NOTE — H&P
Short Stay Endoscopy History and Physical    PCP - Nery Oleary NP  Referring Physician - Marty Reece MD  [address]  [address]  [place] [address]    Procedure - ercp  ASA - per anesthesia  Mallampati - per anesthesia  History of Anesthesia problems - no  Family history Anesthesia problems -  no   Plan of anesthesia - General    HPI:  This is a 55 y.o. male here for evaluation of: PSC    Reflux - no  Dysphagia - no  Abdominal pain - no  Diarrhea - no    ROS:  Constitutional: No fevers, chills, No weight loss  CV: No chest pain  Pulm: No cough, No shortness of breath  Ophtho: No vision changes  GI: see HPI  Derm: No rash    Medical History:  has a past medical history of Allergy, Anemia, Asthma, External hemorrhoids with other complication (05/08/2011), General anesthetics causing adverse effect in therapeutic use, History of recent hospitalization, History of recent hospitalization, Liver disease, Migraines, Pancreatitis, Primary sclerosing cholangitis, and Ulcerative colitis.    Surgical History:  has a past surgical history that includes tonsillectomy; Cholecystectomy; Excision of thrombosed hemorrhoid (09/2016); Colonoscopy (Left, 12/30/2015); ERCP; Skin biopsy; Tonsillectomy; Colonoscopy (N/A, 5/19/2017); Esophagogastroduodenoscopy (N/A, 3/8/2019); Esophageal dilation (N/A, 3/8/2019); and Colonoscopy (N/A, 3/8/2019).    Family History: family history includes Colon cancer (age of onset: 75) in his maternal grandmother; Colon polyps in his maternal grandfather; Heart disease in his mother; Inflammatory bowel disease in his mother; Melanoma in his maternal grandfather; Sarcoidosis in his mother; Skin cancer in his maternal grandfather; Thyroid disease in his mother..    Social History:  reports that he has never smoked. He has never used smokeless tobacco. He reports that he does not drink alcohol and does not use drugs.    Review of patient's allergies indicates:   Allergen Reactions     Cinnamon analogues Hives and Itching    Ciprofloxacin      Other reaction(s): Rash  Other reaction(s): Itching  Other reaction(s): Hives    Mesalamine      Other reaction(s): Diarrhea  Other reaction(s): blood stool and diarrhea    Uceris [budesonide]      Migraines/headaches intensify    Zolmitriptan      Other reaction(s): Angioedema       Medications:   Prescriptions Prior to Admission[1]    Physical Exam:    Vital Signs:   Vitals:    06/24/25 1321   BP: (!) 160/103   Pulse: 83   Resp: 18   Temp: 98.6 °F (37 °C)       General Appearance: Well appearing in no acute distress    Labs:  Lab Results   Component Value Date    WBC 6.77 06/04/2025    HGB 14.8 06/04/2025    HCT 45.8 06/04/2025     06/04/2025    CHOL 207 (H) 04/30/2016    TRIG 110 04/30/2016    HDL 63 04/30/2016    ALT 33 06/04/2025    AST 32 06/04/2025     06/04/2025    K 4.4 06/04/2025     06/04/2025    CREATININE 1.0 06/04/2025    BUN 11 06/04/2025    CO2 27 06/04/2025    TSH 1.220 02/14/2024    INR 1.0 05/19/2025    HGBA1C 5.4 07/24/2013       I have explained the risks and benefits of this endoscopic procedure to the patient including but not limited to bleeding, inflammation, infection, perforation, and death.      Marty Reece MD         [1]   Medications Prior to Admission   Medication Sig Dispense Refill Last Dose/Taking    ascorbic acid (VITAMIN C ORAL) Take 1 tablet by mouth once daily.   6/23/2025    BIFIDOBACTERIUM INFANTIS (ALIGN ORAL) Take 1 capsule by mouth twice a week.   Past Week    cholecalciferol, vitamin D3, (VITAMIN D3 ORAL) Take 1 capsule by mouth once daily.   6/23/2025    copper gluconate 2 mg Tab Take by mouth.   6/23/2025    ondansetron (ZOFRAN-ODT) 4 MG TbDL Take 2 tablets (8 mg total) by mouth every 6 (six) hours as needed (naiusea). 60 tablet 0 Past Month    pantoprazole (PROTONIX) 40 MG tablet Take 40 mg by mouth every morning.   6/24/2025    ubrogepant (UBROGEPANT) 50 mg tablet Take 50 mg by mouth once  as needed for Migraine. May repeat in 1 hour if needed but not more than 2 doses in a 24 hour period   6/24/2025    vancomycin (VANCOCIN) 250 MG capsule Take 1,000 mg by mouth once daily.   6/23/2025    ZINC ORAL Take 1 tablet by mouth once daily.   6/23/2025    dicyclomine (BENTYL) 10 MG capsule Take 10 mg by mouth daily as needed (abdominal cramps).   More than a month    glycerin/witch hazel leaf (HEMORRHOID TOP) Apply topically daily as needed (hemorrhoids). Cream - otc

## 2025-06-24 NOTE — H&P
Short Stay Endoscopy History and Physical    PCP - Nery Oleary NP  Referring Physician - Marty Reece MD  [address]  [address]  [place] [address]    Procedure - ercp  ASA - per anesthesia  Mallampati - per anesthesia  History of Anesthesia problems - no  Family history Anesthesia problems -  no   Plan of anesthesia - General    HPI:  This is a 55 y.o. male here for evaluation of: ercp    Reflux - no  Dysphagia - no  Abdominal pain - no  Diarrhea - no    ROS:  Constitutional: No fevers, chills, No weight loss  CV: No chest pain  Pulm: No cough, No shortness of breath  Ophtho: No vision changes  GI: see HPI  Derm: No rash    Medical History:  has a past medical history of Allergy, Anemia, Asthma, External hemorrhoids with other complication (05/08/2011), General anesthetics causing adverse effect in therapeutic use, History of recent hospitalization, History of recent hospitalization, Liver disease, Migraines, Pancreatitis, Primary sclerosing cholangitis, and Ulcerative colitis.    Surgical History:  has a past surgical history that includes tonsillectomy; Cholecystectomy; Excision of thrombosed hemorrhoid (09/2016); Colonoscopy (Left, 12/30/2015); ERCP; Skin biopsy; Tonsillectomy; Colonoscopy (N/A, 5/19/2017); Esophagogastroduodenoscopy (N/A, 3/8/2019); Esophageal dilation (N/A, 3/8/2019); and Colonoscopy (N/A, 3/8/2019).    Family History: family history includes Colon cancer (age of onset: 75) in his maternal grandmother; Colon polyps in his maternal grandfather; Heart disease in his mother; Inflammatory bowel disease in his mother; Melanoma in his maternal grandfather; Sarcoidosis in his mother; Skin cancer in his maternal grandfather; Thyroid disease in his mother..    Social History:  reports that he has never smoked. He has never used smokeless tobacco. He reports that he does not drink alcohol and does not use drugs.    Review of patient's allergies indicates:   Allergen Reactions     Cinnamon analogues Hives and Itching    Ciprofloxacin      Other reaction(s): Rash  Other reaction(s): Itching  Other reaction(s): Hives    Mesalamine      Other reaction(s): Diarrhea  Other reaction(s): blood stool and diarrhea    Uceris [budesonide]      Migraines/headaches intensify    Zolmitriptan      Other reaction(s): Angioedema       Medications:   Prescriptions Prior to Admission[1]    Physical Exam:    Vital Signs:   Vitals:    06/24/25 1321   BP: (!) 160/103   Pulse: 83   Resp: 18   Temp: 98.6 °F (37 °C)       General Appearance: Well appearing in no acute distress    Labs:  Lab Results   Component Value Date    WBC 6.77 06/04/2025    HGB 14.8 06/04/2025    HCT 45.8 06/04/2025     06/04/2025    CHOL 207 (H) 04/30/2016    TRIG 110 04/30/2016    HDL 63 04/30/2016    ALT 33 06/04/2025    AST 32 06/04/2025     06/04/2025    K 4.4 06/04/2025     06/04/2025    CREATININE 1.0 06/04/2025    BUN 11 06/04/2025    CO2 27 06/04/2025    TSH 1.220 02/14/2024    INR 1.0 05/19/2025    HGBA1C 5.4 07/24/2013       I have explained the risks and benefits of this endoscopic procedure to the patient including but not limited to bleeding, inflammation, infection, perforation, and death.      Marty Reece MD         [1]   Medications Prior to Admission   Medication Sig Dispense Refill Last Dose/Taking    ascorbic acid (VITAMIN C ORAL) Take 1 tablet by mouth once daily.   6/23/2025    BIFIDOBACTERIUM INFANTIS (ALIGN ORAL) Take 1 capsule by mouth twice a week.   Past Week    cholecalciferol, vitamin D3, (VITAMIN D3 ORAL) Take 1 capsule by mouth once daily.   6/23/2025    copper gluconate 2 mg Tab Take by mouth.   6/23/2025    ondansetron (ZOFRAN-ODT) 4 MG TbDL Take 2 tablets (8 mg total) by mouth every 6 (six) hours as needed (naiusea). 60 tablet 0 Past Month    pantoprazole (PROTONIX) 40 MG tablet Take 40 mg by mouth every morning.   6/24/2025    ubrogepant (UBROGEPANT) 50 mg tablet Take 50 mg by mouth once  as needed for Migraine. May repeat in 1 hour if needed but not more than 2 doses in a 24 hour period   6/24/2025    vancomycin (VANCOCIN) 250 MG capsule Take 1,000 mg by mouth once daily.   6/23/2025    ZINC ORAL Take 1 tablet by mouth once daily.   6/23/2025    dicyclomine (BENTYL) 10 MG capsule Take 10 mg by mouth daily as needed (abdominal cramps).   More than a month    glycerin/witch hazel leaf (HEMORRHOID TOP) Apply topically daily as needed (hemorrhoids). Cream - otc

## 2025-06-24 NOTE — PROVATION PATIENT INSTRUCTIONS
Discharge Summary/Instructions after an Endoscopic Procedure  Patient Name: Erik Saravia  Patient MRN: [id number]  Patient YOB: 1970 Tuesday, June 24, 2025  Marty Reece MD  Dear patient,  As a result of recent federal legislation (The Federal Cures Act), you may   receive lab or pathology results from your procedure in your [facility]   account before your physician is able to contact you. Your physician or   their representative will relay the results to you with their   recommendations at their soonest availability.  Thank you,  RESTRICTIONS:  During your procedure today, you received medications for sedation.  These   medications may affect your judgment, balance and coordination.  Therefore,   for 24 hours, you have the following restrictions:   - DO NOT drive a car, operate machinery, make legal/financial decisions,   sign important papers or drink alcohol.    ACTIVITY:  Today: no heavy lifting, straining or running due to procedural   sedation/anesthesia.  The following day: return to full activity including work.  DIET:  Eat and drink normally unless instructed otherwise.     TREATMENT FOR COMMON SIDE EFFECTS:  - Mild abdominal pain, nausea, belching, bloating or excessive gas:  rest,   eat lightly and use a heating pad.  - Sore Throat: treat with throat lozenges and/or gargle with warm salt   water.  - Because air was used during the procedure, expelling large amounts of air   from your rectum or belching is normal.  - If a bowel prep was taken, you may not have a bowel movement for 1-3 days.    This is normal.  SYMPTOMS TO WATCH FOR AND REPORT TO YOUR PHYSICIAN:  1. Abdominal pain or bloating, other than gas cramps.  2. Chest pain.  3. Back pain.  4. Signs of infection such as: chills or fever occurring within 24 hours   after the procedure.  5. Rectal bleeding, which would show as bright red, maroon, or black stools.   (A tablespoon of blood from the rectum is not serious, especially if    hemorrhoids are present.)  6. Vomiting.  7. Weakness or dizziness.  GO DIRECTLY TO THE NEAREST EMERGENCY ROOM IF YOU HAVE ANY OF THE FOLLOWING:      Difficulty breathing              Chills and/or fever over 101 F   Persistent vomiting and/or vomiting blood   Severe abdominal pain   Severe chest pain   Black, tarry stools   Bleeding- more than one tablespoon   Any other symptom or condition that you feel may need urgent attention  Your doctor recommends these additional instructions:  If any biopsies were taken, your doctors clinic will contact you in 1 to 2   weeks with any results.  - Discharge patient to home.   - Resume previous diet.   - Continue present medications.   - Await cytology results.   - Augmentin (amoxicillin/clavulanate) 875 mg PO BID for 7 days.   - Follow up with hepatology  For questions, problems or results please call your physician - Marty Reece MD at Work:  [phone].  [facility], EMERGENCY ROOM PHONE NUMBER: [phone]  IF A COMPLICATION OR EMERGENCY SITUATION ARISES AND YOU ARE UNABLE TO REACH   YOUR PHYSICIAN - GO DIRECTLY TO THE EMERGENCY ROOM.  Marty Reece MD  6/24/2025 2:57:15 PM  This report has been verified and signed electronically.  Dear patient,  As a result of recent federal legislation (The Federal Cures Act), you may   receive lab or pathology results from your procedure in your [facility]   account before your physician is able to contact you. Your physician or   their representative will relay the results to you with their   recommendations at their soonest availability.  Thank you,  PROVATION

## 2025-06-24 NOTE — TRANSFER OF CARE
Anesthesia Transfer of Care Note    Patient: Erik Saravia    Procedure(s) Performed: Procedure(s) (LRB):  ERCP (ENDOSCOPIC RETROGRADE CHOLANGIOPANCREATOGRAPHY) (N/A)    Patient location: [facility]    Anesthesia Type: general    Transport from OR: Transported from OR on room air with adequate spontaneous ventilation    Post pain: adequate analgesia    Post assessment: no apparent anesthetic complications    Post vital signs: stable    Level of consciousness: awake, alert and oriented    Nausea/Vomiting: no nausea/vomiting    Complications: none    Transfer of care protocol was followed      Last vitals: Visit Vitals  BP (!) 160/103   Pulse 83   Temp 37 °C (98.6 °F) (Temporal)   Resp 18   SpO2 96%

## 2025-06-27 ENCOUNTER — PATIENT MESSAGE (OUTPATIENT)
Dept: GASTROENTEROLOGY | Facility: HOSPITAL | Age: 55
End: 2025-06-27
Payer: MEDICAID

## 2025-06-27 LAB
ESTROGEN SERPL-MCNC: NORMAL PG/ML
INSULIN SERPL-ACNC: NORMAL U[IU]/ML
LAB AP CLINICAL INFORMATION: NORMAL
LAB AP GROSS DESCRIPTION: NORMAL
LAB AP NON-GYN INTERPRETATION SPECIMEN 1: NORMAL
LAB AP PERFORMING LOCATION(S): NORMAL
LAB AP REPORT FOOTNOTES: NORMAL

## 2025-07-01 ENCOUNTER — TELEPHONE (OUTPATIENT)
Dept: HEPATOLOGY | Facility: CLINIC | Age: 55
End: 2025-07-01
Payer: MEDICAID

## 2025-07-01 NOTE — TELEPHONE ENCOUNTER
Pt wanted to schedule a f/u appointment. Pt scheduled for next virtual f/u in Septmebr and was added to wait list for sooner appointments.

## 2025-07-01 NOTE — TELEPHONE ENCOUNTER
Copied from CRM #[id number]. Topic: Appointments - Appointment Access  >> Jul 1, 2025 10:44 AM Chelle wrote:    Name Of Caller:   Erik    Contact Preference:  [phone]    Nature of Call:   Pt was informed by Dr. Reece of Gastroenterology to follow up with Dr. Oneal after ercp was completed. Requesting a call back.

## 2025-07-09 ENCOUNTER — TELEPHONE (OUTPATIENT)
Dept: HEPATOLOGY | Facility: CLINIC | Age: 55
End: 2025-07-09
Payer: MEDICAID

## 2025-07-09 NOTE — TELEPHONE ENCOUNTER
Received message from Melany saleem/Dr [facility] at [phone] ext [phone].    Secure chat message sent to Dr ZUNILDA Oneal.  Response:  I was aware of this Ca 19 9 result- He just had an ERCP and will reapeat his imaging soon. Doesn't need to be seen in clinic.  Please tell the patient that I will be in touch with further steps.      Call placed to Nurse Melany at the above number. No Answer, only a recorder.  Left VM message to return call to the Ofc of Dr Oneal to receive the message regarding Erik Saravia at [phone]. This is Cassie.      Call placed to the Patient at [phone].  Message relayed from Dr Oneal.  Patient stated I will wait to hear from him.  Verbalized understanding.

## 2025-07-09 NOTE — TELEPHONE ENCOUNTER
Copied from CRM #[id number]. Topic: General Inquiry - Patient Advice  >> Jul 9, 2025 12:51 PM Anna wrote:  Melany from Dr. Sutton office is calling to get PT sched for sooner - possibly Emergency appt to be seen sooner. States that CA- 19 has gone way up and needs to be followed up on sooner that current date. Appt added to wait list. Please reach out to Melany at Dr. Sutton office to discuss details        [phone]  ext [phone]  Melany    Patient can be contacted @#  [phone]

## 2025-07-10 ENCOUNTER — TELEPHONE (OUTPATIENT)
Dept: HEPATOLOGY | Facility: CLINIC | Age: 55
End: 2025-07-10
Payer: MEDICAID

## 2025-07-10 NOTE — TELEPHONE ENCOUNTER
Received call this am from Melany with Dr [facility]'s [facility] [phone].    Secure Chat message response from Dr Oneal on yesterday relayed to Melany.  Melany voiced understanding.

## 2025-07-10 NOTE — TELEPHONE ENCOUNTER
Copied from CRM #[id number]. Topic: Appointments - Appointment Rescheduling  >> Jul 9, 2025 12:40 PM Anna wrote:  Melany from Dr[facility] Sutton office is calling to get PT sched for sooner - possibly Emergency appt to be seen sooner. States that CA- 19 has gone way up and needs to be followed up on sooner that current date. Appt added to wait list. Please reach out to Melany at Dr. Sutton office to discuss details     [phone]  ext [phone]  Melany  >> Jul 9, 2025  4:10 PM NAZANIN Matthews wrote:

## 2025-07-28 ENCOUNTER — TELEPHONE (OUTPATIENT)
Dept: HEPATOLOGY | Facility: CLINIC | Age: 55
End: 2025-07-28
Payer: MEDICAID

## 2025-07-28 NOTE — TELEPHONE ENCOUNTER
Copied from CRM #[id number]. Topic: General Inquiry - Patient Advice  >> Jul 28, 2025  8:40 AM Beka wrote:  Consult/Advisory     Name Of Caller: patient         Contact Preference: [phone]- patient states that he wife may question his phone and office has fully permission to give all information to his wife        Nature of call: patient states that he would like to follow up on further testing that he was advise that he needed. Please call to advise thank you

## 2025-07-28 NOTE — TELEPHONE ENCOUNTER
"Called placed to pt and talked to pt's wife about what kind of testing they were talking about that pt needs. I stated that I don't see any notes on the chart about getting some test. Other than Dr CHASE stated in one of the encounter in the chart on 7/9/25" I was aware of this Ca 19 9 result- He just had an ERCP and will reapeat his imaging soon. Doesn't need to be seen in clinic.  Please tell the patient that I will be in touch with further steps."      "

## 2025-08-11 ENCOUNTER — OFFICE VISIT (OUTPATIENT)
Dept: PODIATRY | Facility: CLINIC | Age: 55
End: 2025-08-11
Payer: MEDICAID

## 2025-08-11 VITALS — HEIGHT: 71 IN | WEIGHT: 202.19 LBS | BODY MASS INDEX: 28.31 KG/M2

## 2025-08-11 DIAGNOSIS — M72.2 PLANTAR FASCIITIS OF LEFT FOOT: Primary | ICD-10-CM

## 2025-08-11 PROCEDURE — 4010F ACE/ARB THERAPY RXD/TAKEN: CPT | Mod: CPTII,,, | Performed by: PODIATRIST

## 2025-08-11 PROCEDURE — 3044F HG A1C LEVEL LT 7.0%: CPT | Mod: CPTII,,, | Performed by: PODIATRIST

## 2025-08-11 PROCEDURE — 99213 OFFICE O/P EST LOW 20 MIN: CPT | Mod: S$PBB,,, | Performed by: PODIATRIST

## 2025-08-11 PROCEDURE — 3008F BODY MASS INDEX DOCD: CPT | Mod: CPTII,,, | Performed by: PODIATRIST

## 2025-08-11 PROCEDURE — 99213 OFFICE O/P EST LOW 20 MIN: CPT | Mod: PBBFAC,PO | Performed by: PODIATRIST

## 2025-08-11 PROCEDURE — 1159F MED LIST DOCD IN RCRD: CPT | Mod: CPTII,,, | Performed by: PODIATRIST

## 2025-08-11 PROCEDURE — 99999 PR PBB SHADOW E&M-EST. PATIENT-LVL III: CPT | Mod: PBBFAC,,, | Performed by: PODIATRIST

## 2025-08-11 PROCEDURE — 1160F RVW MEDS BY RX/DR IN RCRD: CPT | Mod: CPTII,,, | Performed by: PODIATRIST

## 2025-08-11 RX ORDER — METHYLPREDNISOLONE 4 MG/1
TABLET ORAL
Qty: 1 EACH | Refills: 0 | Status: SHIPPED | OUTPATIENT
Start: 2025-08-11 | End: 2025-09-01